# Patient Record
Sex: FEMALE | Race: WHITE | NOT HISPANIC OR LATINO | Employment: OTHER | URBAN - METROPOLITAN AREA
[De-identification: names, ages, dates, MRNs, and addresses within clinical notes are randomized per-mention and may not be internally consistent; named-entity substitution may affect disease eponyms.]

---

## 2017-04-21 ENCOUNTER — TELEPHONE (OUTPATIENT)
Dept: CARDIOLOGY | Facility: CLINIC | Age: 67
End: 2017-04-21

## 2017-04-21 DIAGNOSIS — I25.10 CORONARY ARTERY DISEASE WITHOUT ANGINA PECTORIS, UNSPECIFIED VESSEL OR LESION TYPE, UNSPECIFIED WHETHER NATIVE OR TRANSPLANTED HEART: ICD-10-CM

## 2017-04-21 DIAGNOSIS — Z01.818 PREOPERATIVE CLEARANCE: Primary | ICD-10-CM

## 2017-05-01 ENCOUNTER — HOSPITAL ENCOUNTER (OUTPATIENT)
Dept: CARDIOLOGY | Facility: HOSPITAL | Age: 67
Discharge: HOME | End: 2017-05-01
Attending: INTERNAL MEDICINE

## 2017-05-01 ENCOUNTER — APPOINTMENT (OUTPATIENT)
Dept: CARDIOLOGY | Facility: HOSPITAL | Age: 67
End: 2017-05-01
Attending: INTERNAL MEDICINE

## 2017-05-01 ENCOUNTER — HOSPITAL ENCOUNTER (OUTPATIENT)
Dept: CARDIOLOGY | Facility: HOSPITAL | Age: 67
Discharge: HOME | End: 2017-05-01
Attending: INTERNAL MEDICINE | Admitting: INTERNAL MEDICINE

## 2017-05-01 VITALS
DIASTOLIC BLOOD PRESSURE: 80 MMHG | BODY MASS INDEX: 38.51 KG/M2 | HEIGHT: 61 IN | WEIGHT: 204 LBS | HEART RATE: 55 BPM | SYSTOLIC BLOOD PRESSURE: 160 MMHG

## 2017-05-01 DIAGNOSIS — I25.10 CORONARY ARTERY DISEASE WITHOUT ANGINA PECTORIS, UNSPECIFIED VESSEL OR LESION TYPE, UNSPECIFIED WHETHER NATIVE OR TRANSPLANTED HEART: ICD-10-CM

## 2017-05-01 DIAGNOSIS — Z01.818 PREOPERATIVE CLEARANCE: ICD-10-CM

## 2017-05-01 PROCEDURE — 78452 HT MUSCLE IMAGE SPECT MULT: CPT

## 2017-05-01 PROCEDURE — 93018 CV STRESS TEST I&R ONLY: CPT | Performed by: INTERNAL MEDICINE

## 2017-05-01 PROCEDURE — 93017 CV STRESS TEST TRACING ONLY: CPT

## 2017-05-01 PROCEDURE — 25010000002 REGADENOSON 0.4 MG/5ML SOLUTION: Performed by: INTERNAL MEDICINE

## 2017-05-01 PROCEDURE — 78452 HT MUSCLE IMAGE SPECT MULT: CPT | Performed by: INTERNAL MEDICINE

## 2017-05-01 PROCEDURE — 0 TECHNETIUM SESTAMIBI: Performed by: INTERNAL MEDICINE

## 2017-05-01 PROCEDURE — A9500 TC99M SESTAMIBI: HCPCS | Performed by: INTERNAL MEDICINE

## 2017-05-01 RX ADMIN — Medication 1 DOSE: at 13:40

## 2017-05-01 RX ADMIN — Medication 1 DOSE: at 11:50

## 2017-05-01 RX ADMIN — REGADENOSON 0.4 MG: 0.08 INJECTION, SOLUTION INTRAVENOUS at 13:33

## 2017-05-02 LAB
BH CV STRESS BP STAGE 1: NORMAL
BH CV STRESS BP STAGE 3: NORMAL
BH CV STRESS COMMENTS STAGE 1: NORMAL
BH CV STRESS DOSE REGADENOSON STAGE 1: 0.4
BH CV STRESS DURATION MIN STAGE 1: 1
BH CV STRESS DURATION MIN STAGE 2: 1
BH CV STRESS DURATION MIN STAGE 3: 1
BH CV STRESS DURATION MIN STAGE 4: 1
BH CV STRESS DURATION SEC STAGE 1: 0
BH CV STRESS DURATION SEC STAGE 2: 0
BH CV STRESS DURATION SEC STAGE 3: 0
BH CV STRESS DURATION SEC STAGE 4: 0
BH CV STRESS HR STAGE 1: 62
BH CV STRESS HR STAGE 2: 82
BH CV STRESS HR STAGE 3: 80
BH CV STRESS HR STAGE 4: 79
BH CV STRESS O2 STAGE 1: 99
BH CV STRESS O2 STAGE 2: 100
BH CV STRESS O2 STAGE 3: 100
BH CV STRESS O2 STAGE 4: 99
BH CV STRESS PROTOCOL 1: NORMAL
BH CV STRESS RECOVERY BP: NORMAL MMHG
BH CV STRESS RECOVERY HR: 72 BPM
BH CV STRESS RECOVERY O2: 100 %
BH CV STRESS STAGE 1: 1
BH CV STRESS STAGE 2: 2
BH CV STRESS STAGE 3: 3
BH CV STRESS STAGE 4: 4
LV EF NUC BP: 62 %
MAXIMAL PREDICTED HEART RATE: 153 BPM
PERCENT MAX PREDICTED HR: 53.59 %
STRESS BASELINE BP: NORMAL MMHG
STRESS BASELINE HR: 53 BPM
STRESS O2 SAT REST: 99 %
STRESS PERCENT HR: 63 %
STRESS POST ESTIMATED WORKLOAD: 1 METS
STRESS POST EXERCISE DUR MIN: 4 MIN
STRESS POST EXERCISE DUR SEC: 0 SEC
STRESS POST O2 SAT PEAK: 99 %
STRESS POST PEAK BP: NORMAL MMHG
STRESS POST PEAK HR: 82 BPM
STRESS TARGET HR: 130 BPM

## 2017-06-26 ENCOUNTER — OFFICE VISIT (OUTPATIENT)
Dept: CARDIOLOGY | Facility: CLINIC | Age: 67
End: 2017-06-26

## 2017-06-26 VITALS
BODY MASS INDEX: 39.08 KG/M2 | DIASTOLIC BLOOD PRESSURE: 74 MMHG | HEIGHT: 61 IN | SYSTOLIC BLOOD PRESSURE: 136 MMHG | OXYGEN SATURATION: 97 % | WEIGHT: 207 LBS | HEART RATE: 60 BPM

## 2017-06-26 DIAGNOSIS — E78.2 MIXED HYPERLIPIDEMIA: ICD-10-CM

## 2017-06-26 DIAGNOSIS — I73.9 PVD (PERIPHERAL VASCULAR DISEASE) (HCC): Primary | ICD-10-CM

## 2017-06-26 DIAGNOSIS — I25.10 CORONARY ARTERY DISEASE INVOLVING NATIVE CORONARY ARTERY OF NATIVE HEART WITHOUT ANGINA PECTORIS: ICD-10-CM

## 2017-06-26 DIAGNOSIS — I10 ESSENTIAL HYPERTENSION: ICD-10-CM

## 2017-06-26 PROCEDURE — 99213 OFFICE O/P EST LOW 20 MIN: CPT | Performed by: INTERNAL MEDICINE

## 2017-06-26 RX ORDER — FLUOXETINE HYDROCHLORIDE 20 MG/1
20 CAPSULE ORAL DAILY
COMMUNITY

## 2017-06-26 RX ORDER — UBIDECARENONE 200 MG
100 CAPSULE ORAL DAILY
COMMUNITY

## 2017-06-26 NOTE — PROGRESS NOTES
[facility]  1950  [phone]          PCP: Lupe Tadeo, APRN  [address] [facility] [address]  [facility] [address]    IDENTIFICATION: A 66 y.o. female from [facility].      CHIEF COMPLAINT: Followup for coronary disease, peripheral vascular disease, hypertension, and dyslipidemia.      PROBLEM LIST:  1. Coronary artery disease:  a. Multiple interventions with greater than 60-mm stent to RCA, 2000 to 2006.   b. January 2007, CABG x3 with LIMA to LAD, SVG to PDA, and SVG to posterolateral of the RCA, Dr. Siddiqi.  c. April 2014, [facility]: Patent SVG to PDA. Occluded SVG to PL and atretic LIMA. Unchanged from 2011, normal LVEF.   d. 5/17 lexiscan wnl EF 62%  2. Peripheral vascular disease:  a. In 1997, infrarenal abdominal aortic stent per Dr. Spain.  b. In 2006, carotid duplex, nonobstructive disease, minimal plaque noted.  c. April 2008, 5.0 x 27 bifurcational Express stents to iliac, ostium per Dr. العلي.  d. April 2008, AA with runoffs with patent infrarenal aortic stent with no evidence of compromise.  e. Discrepant upper extremity blood pressures with a 20-mm gradient left subclavian stenosis, status post subclavian angiography revealing proximal left subclavian stenosis not felt in need of revascularization at current, January 2011.  f. September 2012, mechanical thrombectomy with stent x2 to the right common iliac artery, stent to the left common iliac artery, bifurcation stent placement to the left external iliac and left common iliac arteries, Dr. Earl.  g. Lower extremity arterial duplex per Dr. Earl reportedly nonobstructive, April 2015.  3. Bilateral knee pain, followed in [facility] per orthopedics:  a. Left total knee replacement surgery per Dr. Fierro, September 2015.  4. Hyperlipidemia.  5. Obstructive sleep apnea on CPAP.  6. Nicotine addiction with smoking cessation, 2011.  7. Dyslipidemia.  8. Gastroesophageal reflux disease.  9. Depression, , 2006.   10. Surgical  history:  a. Appendectomy.   b. Cholecystectomy.   Infrarenal abdominal stent, Dr. Spain, 1997.    Chief Complaint   Patient presents with   • Follow-up     CAD           Allergies  Allergies   Allergen Reactions   • Bactrim [Sulfamethoxazole-Trimethoprim] Itching   • Crestor [Rosuvastatin Calcium]    • Keflex [Cephalexin] Itching   • Lipitor [Atorvastatin] Other (See Comments)     high-dose with thrush.     • Lisinopril    • Lortab [Hydrocodone-Acetaminophen]    • Percocet [Oxycodone-Acetaminophen]    • Pravachol [Pravastatin Sodium]    • Zocor [Simvastatin] Myalgia     Muscle ache   • Diclofenac Sodium Rash   • Diphenhydramine Rash   • Estradiol Rash     Vaginal cream caused itching and rash   • Prednisone Rash   • Tizanidine Hcl Rash       Current Medications    Current Outpatient Prescriptions:   •  aspirin 325 MG tablet, Take  by mouth., Disp: , Rfl:   •  atorvastatin (LIPITOR) 40 MG tablet, Take 80 mg by mouth., Disp: , Rfl:   •  clopidogrel (PLAVIX) 75 MG tablet, Take  by mouth daily., Disp: , Rfl:   •  coenzyme Q10 100 MG capsule, Take 100 mg by mouth Daily., Disp: , Rfl:   •  FLUoxetine (PROzac) 20 MG capsule, Take 20 mg by mouth Daily., Disp: , Rfl:   •  folic acid (FOLVITE) 800 MCG tablet, Take 400 mg by mouth daily., Disp: , Rfl:   •  furosemide (LASIX) 40 MG tablet, Take 20 mg by mouth As Needed., Disp: , Rfl:   •  isosorbide dinitrate (ISORDIL) 30 MG tablet, Take  by mouth Daily., Disp: , Rfl:   •  metoprolol succinate XL (TOPROL-XL) 50 MG 24 hr tablet, Take  by mouth daily., Disp: , Rfl:   •  nitroglycerin (NITROSTAT) 0.4 MG SL tablet, Place  under the tongue., Disp: , Rfl:   •  Omega-3 Fatty Acids (FISH OIL) 1000 MG capsule capsule, Take  by mouth daily., Disp: , Rfl:   •  pantoprazole (PROTONIX) 40 MG EC tablet, Take  by mouth daily., Disp: , Rfl:     History of Present Illness     Pt denies any chest pain, dyspnea, dyspnea on exertion, orthopnea, PND, palpitations, lower extremity edema.  She does  "note however significant bilateral leg pain with most any activities even walking through the home.  She states this is similar to her prior discomfort prior to catheter-based intervention and revascularization of her lower extremities historically.  She has was to see Dr. Fuentes this year but she did not seek further treatment.  She is now sought second opinion quitting clinic and is scheduled 13th of next month undergo attempt at lower extremity revascularization    ROS:  All systems have been reviewed and are negative with the exception of those mentioned in the HPI.    OBJECTIVE:  Vitals:    06/26/17 1425   BP: 136/74   BP Location: Right arm   Patient Position: Sitting   Pulse: 60   SpO2: 97%   Weight: 207 lb (93.9 kg)   Height: 61\" (154.9 cm)     Physical Exam   Constitutional: She appears well-developed and well-nourished.   Neck: Normal range of motion. Neck supple. No hepatojugular reflux and no JVD present. Carotid bruit is not present. No tracheal deviation present. No thyromegaly present.   Cardiovascular: Normal rate, regular rhythm, S1 normal, S2 normal and intact distal pulses.  PMI is not displaced.  Exam reveals no gallop, no distant heart sounds, no friction rub, no midsystolic click and no opening snap.    No murmur heard.  Pulses:       Radial pulses are 2+ on the right side, and 2+ on the left side.        Dorsalis pedis pulses are 1+ on the right side, and 0 on the left side.        Posterior tibial pulses are 1+ on the right side, and 0 on the left side.   Pulmonary/Chest: Effort normal and breath sounds normal. She has no wheezes. She has no rales.   Abdominal: Soft. Bowel sounds are normal. She exhibits no mass. There is no tenderness. There is no guarding.       Diagnostic Data:  Procedures      ASSESSMENT:   Diagnosis Plan   1. PVD (peripheral vascular disease)     2. Essential hypertension     3. Coronary artery disease involving native coronary artery of native heart without angina " pectoris     4. Mixed hyperlipidemia         PLAN:  Provider disease historical revascularization will follow-up with [facility] for her proposed revascularization next month    Hypertension presumed essential controlled on current regimen    coronary disease coronary disease status post surgical revascularization with no anginal equivalent    Dyslipidemia tolerant of statin therapy on coenzyme every 10 continue such    Plan follow-up 9 months otherwise  Lupe Tadeo, APRN, thank you for referring Ms. Flowers for evaluation.  I have forwarded my electronically generated recommendations to you for review.  Please do not hesitate to call with any questions.      Octavio Barber MD, FACC

## 2017-07-17 ENCOUNTER — OFFICE VISIT (OUTPATIENT)
Dept: SLEEP MEDICINE | Facility: HOSPITAL | Age: 67
End: 2017-07-17

## 2017-07-17 VITALS
HEART RATE: 60 BPM | OXYGEN SATURATION: 96 % | HEIGHT: 61 IN | DIASTOLIC BLOOD PRESSURE: 72 MMHG | SYSTOLIC BLOOD PRESSURE: 118 MMHG | WEIGHT: 204.8 LBS | BODY MASS INDEX: 38.67 KG/M2

## 2017-07-17 DIAGNOSIS — Z99.89 OBSTRUCTIVE SLEEP APNEA ON CPAP: ICD-10-CM

## 2017-07-17 DIAGNOSIS — E66.9 OBESITY (BMI 30-39.9): Primary | ICD-10-CM

## 2017-07-17 DIAGNOSIS — G47.33 OBSTRUCTIVE SLEEP APNEA ON CPAP: ICD-10-CM

## 2017-07-17 PROCEDURE — 99214 OFFICE O/P EST MOD 30 MIN: CPT | Performed by: INTERNAL MEDICINE

## 2017-07-17 NOTE — PROGRESS NOTES
Subjective:     Chief Complaint:   Chief Complaint   Patient presents with   • Follow-up       HPI:    Daina Flowers is a 66 y.o. female here for follow-up of obstructive sleep apnea.    Since starting PAP sleep problem has: remained the same  Currently using PAP: yes Hours of usage during the night: 6    Amount of sleep per night : 6 hours  Average length of time it takes to fall asleep : 5 minutes  Number of awakenings per night : 0     She feels fatigue (tiredness, exhaustion, lethargy) in the daytime even when not sleepy? Occasionally   She feels sleepy (or struggles to stay awake) in the daytime? Infrequently    Galt Scale scored as 3/24.    Type of mask: nasal mask    She (since starting PAP or since last visit) has problems with the following:   Pressure from the mask 0 - No Problem  Skin irritation from the mask 0 - No Problem  Mask coming off face 0 - No Problem  Air leaks from the mask 0 - No Problem  Dry mouth or throat 4 - Moderate Problems  Nasal congestion 0 - No Problem    I reviewed her PAP download:  Average pressure: 10  Average AHI:  4  Average minutes in large leak per night: 2      Current medications are:   Current Outpatient Prescriptions:   •  aspirin 325 MG tablet, Take  by mouth., Disp: , Rfl:   •  atorvastatin (LIPITOR) 40 MG tablet, Take 80 mg by mouth., Disp: , Rfl:   •  clopidogrel (PLAVIX) 75 MG tablet, Take  by mouth daily., Disp: , Rfl:   •  coenzyme Q10 100 MG capsule, Take 100 mg by mouth Daily., Disp: , Rfl:   •  FLUoxetine (PROzac) 20 MG capsule, Take 20 mg by mouth Daily., Disp: , Rfl:   •  folic acid (FOLVITE) 800 MCG tablet, Take 400 mg by mouth daily., Disp: , Rfl:   •  furosemide (LASIX) 40 MG tablet, Take 20 mg by mouth As Needed., Disp: , Rfl:   •  isosorbide dinitrate (ISORDIL) 30 MG tablet, Take  by mouth Daily., Disp: , Rfl:   •  metoprolol succinate XL (TOPROL-XL) 50 MG 24 hr tablet, Take  by mouth daily., Disp: , Rfl:   •  nitroglycerin (NITROSTAT) 0.4 MG SL  tablet, Place  under the tongue., Disp: , Rfl:   •  Omega-3 Fatty Acids (FISH OIL) 1000 MG capsule capsule, Take  by mouth daily., Disp: , Rfl:   •  pantoprazole (PROTONIX) 40 MG EC tablet, Take  by mouth daily., Disp: , Rfl: .      The patient's relevant past medical, surgical, family and social history were reviewed and updated in Epic as appropriate.     ROS:    Review of Systems   Constitutional: Positive for fatigue.   Respiratory: Positive for apnea.    Psychiatric/Behavioral: Positive for sleep disturbance.         Objective:    Physical Exam   Constitutional: She is oriented to person, place, and time. She appears well-developed and well-nourished.   HENT:   Head: Normocephalic and atraumatic.   Mouth/Throat: Oropharynx is clear and moist.   Upper denture plate in place  Class IV airway   Neck: Neck supple. No thyromegaly present.   Cardiovascular: Normal rate and regular rhythm.  Exam reveals no gallop and no friction rub.    No murmur heard.  Pulmonary/Chest: Effort normal. No respiratory distress. She has no wheezes. She has no rales.   Musculoskeletal: She exhibits no edema.   Neurological: She is alert and oriented to person, place, and time.   Skin: Skin is warm and dry.   Psychiatric: She has a normal mood and affect. Her behavior is normal.   Vitals reviewed.      Data:    Patient's PAP download was personally reviewed including raw data and results.    Assessment:    Problem List Items Addressed This Visit        Pulmonary Problems    Obstructive sleep apnea on CPAP    Relevant Orders    PAP Therapy       Other    Obesity (BMI 30-39.9) - Primary          She is experiencing some mask discomfort and thinks she would do better with a nasal pillow mask.  I showed her some examples of that.    Plan:     No change in PAP settings.  Change to nasal pillow mask if possible  Continued efforts at further weight loss.  I urged 7-8 hours of sleep per night on average.  I renewed supplies for the next  year.  Follow-up scheduled.  If problems in the interim she knows how to contact us or her DME company.  Discussed the above in detail with the patient.        Signed by  Keyshawn Waters MD

## 2018-04-09 ENCOUNTER — OFFICE VISIT (OUTPATIENT)
Dept: CARDIOLOGY | Facility: CLINIC | Age: 68
End: 2018-04-09

## 2018-04-09 VITALS
SYSTOLIC BLOOD PRESSURE: 136 MMHG | HEART RATE: 65 BPM | DIASTOLIC BLOOD PRESSURE: 50 MMHG | WEIGHT: 211.4 LBS | HEIGHT: 61 IN | BODY MASS INDEX: 39.91 KG/M2

## 2018-04-09 DIAGNOSIS — I73.9 PERIPHERAL VASCULAR DISEASE (HCC): Primary | ICD-10-CM

## 2018-04-09 DIAGNOSIS — E78.2 MIXED HYPERLIPIDEMIA: ICD-10-CM

## 2018-04-09 DIAGNOSIS — I25.10 CORONARY ARTERY DISEASE INVOLVING NATIVE CORONARY ARTERY OF NATIVE HEART WITHOUT ANGINA PECTORIS: ICD-10-CM

## 2018-04-09 DIAGNOSIS — I10 ESSENTIAL HYPERTENSION: ICD-10-CM

## 2018-04-09 PROCEDURE — 99214 OFFICE O/P EST MOD 30 MIN: CPT | Performed by: INTERNAL MEDICINE

## 2018-04-09 NOTE — PROGRESS NOTES
Daina Flowers  1950  [phone]    VISIT DATE: 4/9/2018     PCP: Lupe Tadeo, APRN  [address] [facility] [address]  [facility] [address]    IDENTIFICATION: A 67 y.o. female from [facility].      CHIEF COMPLAINT: Followup for coronary disease, peripheral vascular disease, hypertension, and dyslipidemia.      PROBLEM LIST:  1. Coronary artery disease:  a. Multiple interventions with greater than 60-mm stent to RCA, 2000 to 2006.   b. January 2007, CABG x3 with LIMA to LAD, SVG to PDA, and SVG to posterolateral of the RCA, Dr. Siddiqi.  c. April 2014, [facility]: Patent SVG to PDA. Occluded SVG to PL and atretic LIMA. Unchanged from 2011, normal LVEF.   d. 5/17 lexiscan wnl EF 62%  2. Peripheral vascular disease:  a. In 1997, infrarenal abdominal aortic stent per Dr. Spain.  b. In 2006, carotid duplex, nonobstructive disease, minimal plaque noted.  c. April 2008, 5.0 x 27 bifurcational Express stents to iliac, ostium per Dr. العلي.  d. April 2008, AA with runoffs with patent infrarenal aortic stent with no evidence of compromise.  e. Discrepant upper extremity blood pressures with a 20-mm gradient left subclavian stenosis, status post subclavian angiography revealing proximal left subclavian stenosis not felt in need of revascularization at current, January 2011.  f. September 2012, mechanical thrombectomy with stent x2 to the right common iliac artery, stent to the left common iliac artery, bifurcation stent placement to the left external iliac and left common iliac arteries, Dr. Earl.  g. 7/2017 stent to LLE per [facility]   3. Bilateral knee pain, followed in [facility] per orthopedics:  a. Left total knee replacement surgery per Dr. Fierro, September 2015.  4. Hyperlipidemia.  5. Obstructive sleep apnea on CPAP.  6. Nicotine addiction with smoking cessation, 2011.  7. Dyslipidemia.  8. Gastroesophageal reflux disease.  9. Depression, , 2006.   10. Surgical history:  a. Appendectomy.    b. Cholecystectomy.   c. Infrarenal abdominal stent, Dr. Spain, 1997.    Chief Complaint   Patient presents with   • Coronary Artery Disease   • Dizziness     off and on at home       Allergies  Allergies   Allergen Reactions   • Bactrim [Sulfamethoxazole-Trimethoprim] Itching   • Crestor [Rosuvastatin Calcium]    • Keflex [Cephalexin] Itching   • Lipitor [Atorvastatin] Other (See Comments)     high-dose with thrush.     • Lisinopril    • Lortab [Hydrocodone-Acetaminophen]    • Percocet [Oxycodone-Acetaminophen]    • Pravachol [Pravastatin Sodium]    • Zocor [Simvastatin] Myalgia     Muscle ache   • Diclofenac Sodium Rash   • Diphenhydramine Rash   • Estradiol Rash     Vaginal cream caused itching and rash   • Prednisone Rash   • Tizanidine Hcl Rash       Current Medications    Current Outpatient Prescriptions:   •  aspirin 325 MG tablet, Take  by mouth., Disp: , Rfl:   •  atorvastatin (LIPITOR) 40 MG tablet, Take 80 mg by mouth., Disp: , Rfl:   •  clopidogrel (PLAVIX) 75 MG tablet, Take  by mouth daily., Disp: , Rfl:   •  coenzyme Q10 100 MG capsule, Take 100 mg by mouth Daily., Disp: , Rfl:   •  FLUoxetine (PROzac) 20 MG capsule, Take 20 mg by mouth Daily., Disp: , Rfl:   •  folic acid (FOLVITE) 800 MCG tablet, Take 400 mg by mouth daily., Disp: , Rfl:   •  furosemide (LASIX) 40 MG tablet, Take 20 mg by mouth As Needed., Disp: , Rfl:   •  isosorbide dinitrate (ISORDIL) 30 MG tablet, Take  by mouth Daily., Disp: , Rfl:   •  metoprolol succinate XL (TOPROL-XL) 50 MG 24 hr tablet, Take  by mouth daily., Disp: , Rfl:   •  nitroglycerin (NITROSTAT) 0.4 MG SL tablet, Place  under the tongue., Disp: , Rfl:   •  Omega-3 Fatty Acids (FISH OIL) 1000 MG capsule capsule, Take  by mouth daily., Disp: , Rfl:   •  pantoprazole (PROTONIX) 40 MG EC tablet, Take  by mouth daily., Disp: , Rfl:     History of Present Illness   Pt is doing well. She had revascularization done on her LLE at [facility] last July and her  "claudication has resolved since.   Pt denies any chest pain, dyspnea, dyspnea on exertion, orthopnea, PND, palpitations, lower extremity edema.   Plans on going on a cruise with her sister later this spring.     ROS:  All systems have been reviewed and are negative with the exception of those mentioned in the HPI.    OBJECTIVE:  Vitals:    04/09/18 1409   BP: 136/50   BP Location: Right arm   Patient Position: Sitting   Pulse: 65   Weight: 95.9 kg (211 lb 6.4 oz)   Height: 154.9 cm (61\")     Physical Exam   Constitutional: She appears well-developed and well-nourished.   Neck: Normal range of motion. Neck supple. No hepatojugular reflux and no JVD present. Carotid bruit is not present. No tracheal deviation present. No thyromegaly present.   Cardiovascular: Normal rate, regular rhythm, S1 normal, S2 normal and intact distal pulses.  PMI is not displaced.  Exam reveals no gallop, no distant heart sounds, no friction rub, no midsystolic click and no opening snap.    No murmur heard.  Pulses:       Radial pulses are 2+ on the right side, and 2+ on the left side.        Dorsalis pedis pulses are 1+ on the right side, and 0 on the left side.        Posterior tibial pulses are 1+ on the right side, and 0 on the left side.   Pulmonary/Chest: Effort normal and breath sounds normal. She has no wheezes. She has no rales.   Abdominal: Soft. Bowel sounds are normal. She exhibits no mass. There is no tenderness. There is no guarding.       Diagnostic Data:  Procedures      ASSESSMENT:   Diagnosis Plan   1. Peripheral vascular disease     2. Essential hypertension     3. Coronary artery disease involving native coronary artery of native heart without angina pectoris     4. Mixed hyperlipidemia         PLAN:  S/p revascularization of LLE per [facility]    Hypertension presumed essential controlled on current regimen    coronary disease coronary disease status post surgical revascularization with no anginal " equivalent    Dyslipidemia tolerant of statin therapy on coq10     Plan follow-up 9 months otherwise      Lupe Tadeo, SONAL, thank you for referring Ms. Flowers for evaluation.  I have forwarded my electronically generated recommendations to you for review.  Please do not hesitate to call with any questions.    Scribed for Octavio Barber MD by Sylvia Thayer PA-C. 4/9/2018  2:30 PM  I, Octavio Barber MD, personally performed the services described in this documentation as scribed by the above named individual in my presence, and it is both accurate and complete.  4/9/2018  2:36 PM    Octavio Barber MD, [facility]

## 2018-07-10 ENCOUNTER — APPOINTMENT (OUTPATIENT)
Dept: GENERAL RADIOLOGY | Facility: HOSPITAL | Age: 68
End: 2018-07-10

## 2018-07-10 ENCOUNTER — HOSPITAL ENCOUNTER (EMERGENCY)
Facility: HOSPITAL | Age: 68
Discharge: HOME | End: 2018-07-10
Attending: EMERGENCY MEDICINE | Admitting: EMERGENCY MEDICINE

## 2018-07-10 ENCOUNTER — APPOINTMENT (OUTPATIENT)
Dept: CT IMAGING | Facility: HOSPITAL | Age: 68
End: 2018-07-10

## 2018-07-10 ENCOUNTER — TELEPHONE (OUTPATIENT)
Dept: CARDIOLOGY | Facility: CLINIC | Age: 68
End: 2018-07-10

## 2018-07-10 VITALS
SYSTOLIC BLOOD PRESSURE: 138 MMHG | DIASTOLIC BLOOD PRESSURE: 41 MMHG | BODY MASS INDEX: 38.71 KG/M2 | OXYGEN SATURATION: 97 % | WEIGHT: 205 LBS | HEIGHT: 61 IN | RESPIRATION RATE: 16 BRPM | TEMPERATURE: 97.6 F | HEART RATE: 55 BPM

## 2018-07-10 DIAGNOSIS — G89.29 CHRONIC PAIN OF RIGHT UPPER EXTREMITY: Primary | ICD-10-CM

## 2018-07-10 DIAGNOSIS — M79.601 CHRONIC PAIN OF RIGHT UPPER EXTREMITY: Primary | ICD-10-CM

## 2018-07-10 LAB
ALBUMIN SERPL-MCNC: 4.36 G/DL (ref 3.2–4.8)
ALBUMIN/GLOB SERPL: 1.4 G/DL (ref 1.5–2.5)
ALP SERPL-CCNC: 125 U/L (ref 25–100)
ALT SERPL W P-5'-P-CCNC: 23 U/L (ref 7–40)
ANION GAP SERPL CALCULATED.3IONS-SCNC: 6 MMOL/L (ref 3–11)
AST SERPL-CCNC: 22 U/L (ref 0–33)
BASOPHILS # BLD AUTO: 0.03 10*3/MM3 (ref 0–0.2)
BASOPHILS NFR BLD AUTO: 0.6 % (ref 0–1)
BILIRUB SERPL-MCNC: 0.4 MG/DL (ref 0.3–1.2)
BNP SERPL-MCNC: 82 PG/ML (ref 0–100)
BUN BLD-MCNC: 14 MG/DL (ref 9–23)
BUN/CREAT SERPL: 15.6 (ref 7–25)
CALCIUM SPEC-SCNC: 9.2 MG/DL (ref 8.7–10.4)
CHLORIDE SERPL-SCNC: 105 MMOL/L (ref 99–109)
CO2 SERPL-SCNC: 27 MMOL/L (ref 20–31)
CREAT BLD-MCNC: 0.9 MG/DL (ref 0.6–1.3)
DEPRECATED RDW RBC AUTO: 53.4 FL (ref 37–54)
EOSINOPHIL # BLD AUTO: 0.16 10*3/MM3 (ref 0–0.3)
EOSINOPHIL NFR BLD AUTO: 3 % (ref 0–3)
ERYTHROCYTE [DISTWIDTH] IN BLOOD BY AUTOMATED COUNT: 15.9 % (ref 11.3–14.5)
GFR SERPL CREATININE-BSD FRML MDRD: 62 ML/MIN/1.73
GLOBULIN UR ELPH-MCNC: 3 GM/DL
GLUCOSE BLD-MCNC: 124 MG/DL (ref 70–100)
HCT VFR BLD AUTO: 39.6 % (ref 34.5–44)
HGB BLD-MCNC: 12.7 G/DL (ref 11.5–15.5)
HOLD SPECIMEN: NORMAL
HOLD SPECIMEN: NORMAL
IMM GRANULOCYTES # BLD: 0.02 10*3/MM3 (ref 0–0.03)
IMM GRANULOCYTES NFR BLD: 0.4 % (ref 0–0.6)
LIPASE SERPL-CCNC: 41 U/L (ref 6–51)
LYMPHOCYTES # BLD AUTO: 1.64 10*3/MM3 (ref 0.6–4.8)
LYMPHOCYTES NFR BLD AUTO: 30.7 % (ref 24–44)
MCH RBC QN AUTO: 29.1 PG (ref 27–31)
MCHC RBC AUTO-ENTMCNC: 32.1 G/DL (ref 32–36)
MCV RBC AUTO: 90.6 FL (ref 80–99)
MONOCYTES # BLD AUTO: 0.59 10*3/MM3 (ref 0–1)
MONOCYTES NFR BLD AUTO: 11 % (ref 0–12)
NEUTROPHILS # BLD AUTO: 2.91 10*3/MM3 (ref 1.5–8.3)
NEUTROPHILS NFR BLD AUTO: 54.3 % (ref 41–71)
PLATELET # BLD AUTO: 188 10*3/MM3 (ref 150–450)
PMV BLD AUTO: 9.4 FL (ref 6–12)
POTASSIUM BLD-SCNC: 4.2 MMOL/L (ref 3.5–5.5)
PROT SERPL-MCNC: 7.4 G/DL (ref 5.7–8.2)
RBC # BLD AUTO: 4.37 10*6/MM3 (ref 3.89–5.14)
SODIUM BLD-SCNC: 138 MMOL/L (ref 132–146)
TROPONIN I SERPL-MCNC: 0 NG/ML (ref 0–0.07)
TROPONIN I SERPL-MCNC: 0 NG/ML (ref 0–0.07)
WBC NRBC COR # BLD: 5.35 10*3/MM3 (ref 3.5–10.8)
WHOLE BLOOD HOLD SPECIMEN: NORMAL
WHOLE BLOOD HOLD SPECIMEN: NORMAL

## 2018-07-10 PROCEDURE — 99284 EMERGENCY DEPT VISIT MOD MDM: CPT

## 2018-07-10 PROCEDURE — 73030 X-RAY EXAM OF SHOULDER: CPT

## 2018-07-10 PROCEDURE — 83880 ASSAY OF NATRIURETIC PEPTIDE: CPT | Performed by: EMERGENCY MEDICINE

## 2018-07-10 PROCEDURE — 73090 X-RAY EXAM OF FOREARM: CPT

## 2018-07-10 PROCEDURE — 80053 COMPREHEN METABOLIC PANEL: CPT | Performed by: EMERGENCY MEDICINE

## 2018-07-10 PROCEDURE — 36415 COLL VENOUS BLD VENIPUNCTURE: CPT

## 2018-07-10 PROCEDURE — 71045 X-RAY EXAM CHEST 1 VIEW: CPT

## 2018-07-10 PROCEDURE — 73070 X-RAY EXAM OF ELBOW: CPT

## 2018-07-10 PROCEDURE — 93005 ELECTROCARDIOGRAM TRACING: CPT | Performed by: EMERGENCY MEDICINE

## 2018-07-10 PROCEDURE — 73060 X-RAY EXAM OF HUMERUS: CPT

## 2018-07-10 PROCEDURE — 85025 COMPLETE CBC W/AUTO DIFF WBC: CPT | Performed by: EMERGENCY MEDICINE

## 2018-07-10 PROCEDURE — 84484 ASSAY OF TROPONIN QUANT: CPT

## 2018-07-10 PROCEDURE — 71275 CT ANGIOGRAPHY CHEST: CPT

## 2018-07-10 PROCEDURE — 0 IOPAMIDOL PER 1 ML: Performed by: EMERGENCY MEDICINE

## 2018-07-10 PROCEDURE — 83690 ASSAY OF LIPASE: CPT | Performed by: EMERGENCY MEDICINE

## 2018-07-10 RX ORDER — SODIUM CHLORIDE 0.9 % (FLUSH) 0.9 %
10 SYRINGE (ML) INJECTION AS NEEDED
Status: DISCONTINUED | OUTPATIENT
Start: 2018-07-10 | End: 2018-07-10 | Stop reason: HOSPADM

## 2018-07-10 RX ORDER — TRAMADOL HYDROCHLORIDE 50 MG/1
50 TABLET ORAL EVERY 12 HOURS PRN
Qty: 10 TABLET | Refills: 0 | Status: SHIPPED | OUTPATIENT
Start: 2018-07-10 | End: 2019-04-15

## 2018-07-10 RX ORDER — TRAMADOL HYDROCHLORIDE 50 MG/1
50 TABLET ORAL ONCE
Status: DISCONTINUED | OUTPATIENT
Start: 2018-07-10 | End: 2018-07-10 | Stop reason: HOSPADM

## 2018-07-10 RX ORDER — ACETAMINOPHEN 500 MG
1000 TABLET ORAL ONCE
Status: COMPLETED | OUTPATIENT
Start: 2018-07-10 | End: 2018-07-10

## 2018-07-10 RX ADMIN — IOPAMIDOL 80 ML: 755 INJECTION, SOLUTION INTRAVENOUS at 11:09

## 2018-07-10 RX ADMIN — ACETAMINOPHEN 1000 MG: 500 TABLET, FILM COATED ORAL at 12:52

## 2018-07-10 NOTE — TELEPHONE ENCOUNTER
Patient called wanting to schedule an appt with Dr Barber right away as she was seen in ER for right arm pain. Advised her to f/u with chest pain clinic as er advised and pcp in a week. If chest pain clinic feels she needs earlier appt with DR Barber they will contact us. Patient verbalized understanding.

## 2018-07-10 NOTE — ED PROVIDER NOTES
Subjective   67-year-old female with extensive past medical history, most notably coronary artery disease, presents for evaluation of atraumatic right upper extremity pain.  She states that at approximately 5:30 AM this morning, she began experiencing pain in her right shoulder that radiated to her right upper extremity as well as her upper back and has persisted since that time.  No accompanying chest pain or shortness of breath.  No vomiting.  No diaphoresis.  She is right-handed.  She is unsure as to what may have triggered her symptoms and denies any recent injury, fall, or trauma.  No paresthesias.  The pain is exacerbated by movement of her right upper extremity and range of motion.  No fevers or systemic symptoms.  Her pain is currently 8 out of 10 in severity.        History provided by:  Patient  Arm Pain   Location:  Right arm   Quality:  8/10  Severity:  Moderate  Onset quality:  Gradual  Duration:  5 hours  Timing:  Constant  Progression:  Worsening  Chronicity:  New  Context:  She is right handed, no trauma or heavy lifting. distal pain to proximal arm/shoulder   Relieved by:  Nothing  Worsened by:  Movement  Ineffective treatments:  Rest  Associated symptoms: myalgias    Associated symptoms: no abdominal pain, no chest pain and no rash    Risk factors:  H/o MI       Review of Systems   Cardiovascular: Negative for chest pain.   Gastrointestinal: Negative for abdominal pain.   Musculoskeletal: Positive for myalgias. Negative for back pain and neck pain.        Right upper extremity pain   Skin: Negative for rash.   All other systems reviewed and are negative.      Past Medical History:   Diagnosis Date   • Coronary artery disease    • Depression 2006    Depression, , 2006.    • Dyslipidemia    • GERD (gastroesophageal reflux disease)    • Hyperlipidemia    • Knee pain    • Nicotine addiction     Nicotine addiction with smoking cessation, 2011.   • Obstructive sleep apnea on CPAP    • Peripheral  vascular disease (CMS/HCC)        Allergies   Allergen Reactions   • Bactrim [Sulfamethoxazole-Trimethoprim] Itching   • Crestor [Rosuvastatin Calcium]    • Keflex [Cephalexin] Itching   • Lipitor [Atorvastatin] Other (See Comments)     high-dose with thrush.     • Lisinopril    • Lortab [Hydrocodone-Acetaminophen]    • Percocet [Oxycodone-Acetaminophen]    • Pravachol [Pravastatin Sodium]    • Zocor [Simvastatin] Myalgia     Muscle ache   • Diclofenac Sodium Rash   • Diphenhydramine Rash   • Estradiol Rash     Vaginal cream caused itching and rash   • Prednisone Rash   • Tizanidine Hcl Rash       Past Surgical History:   Procedure Laterality Date   • ABDOMINAL AORTA STENT  1997    Infrarenal abdominal stent, Dr. Spain, 1997.   • APPENDECTOMY     • CHOLECYSTECTOMY     • CORONARY ANGIOPLASTY WITH STENT PLACEMENT Right 2000    Multiple interventions with greater than 60-mm stent to RCA, 2000 to 2006.    • CORONARY ARTERY BYPASS GRAFT  01/2007 January 2007, CABG x3 with LIMA to LAD, SVG to PDA, and SVG to posterolateral of the RCA, Dr. Siddiqi.   • ILIAC ARTERY STENT  04/2008 April 2008, 5.0 x 27 bifurcational Express stents to iliac, ostium per Dr. العلي.   • ILIAC ARTERY STENT Left 09/2012 September 2012, mechanical thrombectomy with stent x2 to the right common iliac artery, stent to the left common iliac artery, bifurcation stent placement to the left external iliac and left common iliac arteries, Dr. Earl.   • TOTAL KNEE ARTHROPLASTY Left 09/2015    Left total knee replacement surgery per Dr. Fierro, September 2015.       Family History   Problem Relation Age of Onset   • Heart disease Mother    • Hypertension Mother    • Heart attack Mother    • Stroke Father        Social History     Social History   • Marital status:      Social History Main Topics   • Smoking status: Former Smoker     Quit date: 2011   • Smokeless tobacco: Never Used   • Alcohol use No   • Drug use: No   • Sexual activity:  Defer     Other Topics Concern   • Not on file         Objective   Physical Exam   Constitutional: She is oriented to person, place, and time. She appears well-developed and well-nourished. No distress.   Well-appearing elderly female in no acute distress   HENT:   Head: Normocephalic and atraumatic.   Mouth/Throat: Oropharynx is clear and moist.   Neck: Normal range of motion. Neck supple. No JVD present.   No bruits   Cardiovascular: Normal rate, regular rhythm, normal heart sounds and intact distal pulses.  Exam reveals no gallop and no friction rub.    No murmur heard.  No pulse deficits   Pulmonary/Chest: Effort normal and breath sounds normal. No respiratory distress. She has no wheezes. She has no rales.   Abdominal: Soft. Bowel sounds are normal. She exhibits no distension and no mass. There is no tenderness. There is no rebound and no guarding.   Musculoskeletal: Normal range of motion.   Range of motion of right upper extremity within normal limits but painful with abduction of right shoulder, neurovascularly intact distally with bounding distal pulses and normal sensation, normal  strength   Neurological: She is alert and oriented to person, place, and time. No cranial nerve deficit or sensory deficit. Coordination normal.   Skin: Skin is warm and dry. No rash noted. She is not diaphoretic. No erythema.   Psychiatric: She has a normal mood and affect. Judgment and thought content normal.   Nursing note and vitals reviewed.      Procedures         ED Course  ED Course as of Jul 10 2109   Tue Jul 10, 2018   1026 67-year-old female presents complaining of atraumatic right upper extremity pain and 5:30 AM.  Of note, she has a history of coronary artery disease.  She denies any injury, trauma, fall, or known triggers for her symptoms.  Of note she is right-handed.  Her pain is exacerbated by range of motion and movement.  No accompanying chest pain, shortness of breath, vomiting, or diaphoresis.  On  "arrival to the ED, patient well-appearing with benign exam.  Right upper extremity neurovascularly intact distally with bounding distal pulses, normal sensation, and normal range of motion.  Her initial EKG revealed normal sinus rhythm with a heart rate of 64 and an incomplete right bundle branch block but was otherwise unrevealing.  We will obtain labs and imaging and reassess following initial interventions.  [DD]   1111 Labs unrevealing.  [DD]   1113 Plain films negative.  [DD]   1218 Chest CTA negative for acute emergent process.  [DD]   1332 Repeat troponin/EKG negative/unchanged.  Upon reevaluation, patient improved.  Reassured and counseled regarding symptomatic treatment.  Doubt ACS, PE, dissection, or emergent cardiothoracic process at this time based on exam, history, current presentation, and gestalt, and objective findings in the ED.  I feel that the patient's symptoms are most likely musculoskeletal in nature.  Reassured and counseled regarding symptomatic treatment.  She will follow-up with her primary care physician within one week.  Additionally, referred to our chest pain clinic and will follow-up within 72 hours based on her history.  Agreeable with plan and given appropriate return precautions.  [DD]   1354 Range of motion in right shoulder within normal limits.  Doubt septic joint.  [DD]      ED Course User Index  [DD] Michael Roper MD      No results found for this or any previous visit (from the past 24 hour(s)).  Note: In addition to lab results from this visit, the labs listed above may include labs taken at another facility or during a different encounter within the last 24 hours. Please correlate lab times with ED admission and discharge times for further clarification of the services performed during this visit.    XR Chest 1 View    (Results Pending)   CT Angiogram Chest With Contrast    (Results Pending)     Vitals:    07/10/18 1013   Weight: 93 kg (205 lb)   Height: 154.9 cm (61\") "     Medications   sodium chloride 0.9 % flush 10 mL (not administered)     ECG/EMG Results (last 24 hours)     Procedure Component Value Units Date/Time    ECG 12 Lead [[id number]] Collected:  07/10/18 1015     Updated:  07/10/18 1016                    Recent Results (from the past 24 hour(s))   POC Troponin, Rapid    Collection Time: 07/10/18 10:15 AM   Result Value Ref Range    Troponin I 0.00 0.00 - 0.07 ng/mL   Comprehensive Metabolic Panel    Collection Time: 07/10/18 10:18 AM   Result Value Ref Range    Glucose 124 (H) 70 - 100 mg/dL    BUN 14 9 - 23 mg/dL    Creatinine 0.90 0.60 - 1.30 mg/dL    Sodium 138 132 - 146 mmol/L    Potassium 4.2 3.5 - 5.5 mmol/L    Chloride 105 99 - 109 mmol/L    CO2 27.0 20.0 - 31.0 mmol/L    Calcium 9.2 8.7 - 10.4 mg/dL    Total Protein 7.4 5.7 - 8.2 g/dL    Albumin 4.36 3.20 - 4.80 g/dL    ALT (SGPT) 23 7 - 40 U/L    AST (SGOT) 22 0 - 33 U/L    Alkaline Phosphatase 125 (H) 25 - 100 U/L    Total Bilirubin 0.4 0.3 - 1.2 mg/dL    eGFR Non African Amer 62 >60 mL/min/1.73    Globulin 3.0 gm/dL    A/G Ratio 1.4 (L) 1.5 - 2.5 g/dL    BUN/Creatinine Ratio 15.6 7.0 - 25.0    Anion Gap 6.0 3.0 - 11.0 mmol/L   Lipase    Collection Time: 07/10/18 10:18 AM   Result Value Ref Range    Lipase 41 6 - 51 U/L   BNP    Collection Time: 07/10/18 10:18 AM   Result Value Ref Range    BNP 82.0 0.0 - 100.0 pg/mL   Light Blue Top    Collection Time: 07/10/18 10:18 AM   Result Value Ref Range    Extra Tube hold for add-on    Green Top (Gel)    Collection Time: 07/10/18 10:18 AM   Result Value Ref Range    Extra Tube Hold for add-ons.    Lavender Top    Collection Time: 07/10/18 10:18 AM   Result Value Ref Range    Extra Tube hold for add-on    Gold Top - SST    Collection Time: 07/10/18 10:18 AM   Result Value Ref Range    Extra Tube Hold for add-ons.    CBC Auto Differential    Collection Time: 07/10/18 10:18 AM   Result Value Ref Range    WBC 5.35 3.50 - 10.80 10*3/mm3    RBC 4.37 3.89 - 5.14 10*6/mm3     Hemoglobin 12.7 11.5 - 15.5 g/dL    Hematocrit 39.6 34.5 - 44.0 %    MCV 90.6 80.0 - 99.0 fL    MCH 29.1 27.0 - 31.0 pg    MCHC 32.1 32.0 - 36.0 g/dL    RDW 15.9 (H) 11.3 - 14.5 %    RDW-SD 53.4 37.0 - 54.0 fl    MPV 9.4 6.0 - 12.0 fL    Platelets 188 150 - 450 10*3/mm3    Neutrophil % 54.3 41.0 - 71.0 %    Lymphocyte % 30.7 24.0 - 44.0 %    Monocyte % 11.0 0.0 - 12.0 %    Eosinophil % 3.0 0.0 - 3.0 %    Basophil % 0.6 0.0 - 1.0 %    Immature Grans % 0.4 0.0 - 0.6 %    Neutrophils, Absolute 2.91 1.50 - 8.30 10*3/mm3    Lymphocytes, Absolute 1.64 0.60 - 4.80 10*3/mm3    Monocytes, Absolute 0.59 0.00 - 1.00 10*3/mm3    Eosinophils, Absolute 0.16 0.00 - 0.30 10*3/mm3    Basophils, Absolute 0.03 0.00 - 0.20 10*3/mm3    Immature Grans, Absolute 0.02 0.00 - 0.03 10*3/mm3   POC Troponin, Rapid    Collection Time: 07/10/18 12:27 PM   Result Value Ref Range    Troponin I 0.00 0.00 - 0.07 ng/mL     Note: In addition to lab results from this visit, the labs listed above may include labs taken at another facility or during a different encounter within the last 24 hours. Please correlate lab times with ED admission and discharge times for further clarification of the services performed during this visit.    CT Angiogram Chest With & Without Contrast   Final Result   Pleural-based 9 mm pulmonary nodule at the right lung base   in which follow-up imaging is recommended as indicated. No acute   intrathoracic abnormality. No thoracic aortic dissection, aneurysmal   dilatation or intimal flap.       D:  07/10/2018   E:  07/10/2018               This report was finalized on 7/10/2018 2:09 PM by Dr. Romy Taylor MD.          XR Forearm 2 View Right   Final Result   Degenerative changes seen within the wrist with no evidence   of fracture or dislocation.       D:  07/10/2018   E:  07/10/2018       This report was finalized on 7/10/2018 1:00 PM by Dr. Romy Taylor MD.          XR Humerus Right   Final Result   No acute  bony abnormality is identified.       D:  07/10/2018   E:  07/10/2018       This report was finalized on 7/10/2018 1:00 PM by Dr. Romy Taylor MD.          XR Elbow 2 View Right   Final Result   No acute bony abnormality.       D:  07/10/2018   E:  07/10/2018       This report was finalized on 7/10/2018 1:00 PM by Dr. Romy Taylor MD.          XR Chest 1 View   Final Result   No acute cardiopulmonary disease.       D:  07/10/2018   E:  07/10/2018       This report was finalized on 7/10/2018 1:00 PM by Dr. Romy Taylor MD.          XR Shoulder 2+ View Right   Final Result   No acute bony abnormality.       D:  07/10/2018   E:  07/10/2018       This report was finalized on 7/10/2018 12:23 PM by Dr. Romy Taylor MD.            Vitals:    07/10/18 1025 07/10/18 1030 07/10/18 1244 07/10/18 1415   BP: 150/72 165/77 147/67 138/41   BP Location:    Left arm   Patient Position: Lying   Sitting   Pulse:  58 56 55   Resp:    16   Temp:    97.6 °F (36.4 °C)   TempSrc:    Oral   SpO2:  98% 96% 97%   Weight:       Height:         Medications   iopamidol (ISOVUE-370) 76 % injection 100 mL (80 mL Intravenous Given 7/10/18 1109)   acetaminophen (TYLENOL) tablet 1,000 mg (1,000 mg Oral Given 7/10/18 1252)     ECG/EMG Results (last 24 hours)     Procedure Component Value Units Date/Time    ECG 12 Lead [[id number]] Collected:  07/10/18 1015     Updated:  07/10/18 1021    ECG 12 Lead [[id number]] Collected:  07/10/18 1216     Updated:  07/10/18 1225            MDM    Final diagnoses:   Chronic pain of right upper extremity       Documentation assistance provided by tete Cormier.  Information recorded by the tete was done at my direction and has been verified and validated by me.     Sarwat Cormier  07/10/18 1019       Michael Roper MD  07/10/18 [id number]

## 2018-07-17 ENCOUNTER — OFFICE VISIT (OUTPATIENT)
Dept: SLEEP MEDICINE | Facility: HOSPITAL | Age: 68
End: 2018-07-17

## 2018-07-17 VITALS
OXYGEN SATURATION: 97 % | WEIGHT: 208.8 LBS | SYSTOLIC BLOOD PRESSURE: 125 MMHG | DIASTOLIC BLOOD PRESSURE: 73 MMHG | HEIGHT: 61 IN | HEART RATE: 65 BPM | BODY MASS INDEX: 39.42 KG/M2

## 2018-07-17 DIAGNOSIS — G47.33 OSA (OBSTRUCTIVE SLEEP APNEA): Primary | ICD-10-CM

## 2018-07-17 PROCEDURE — 99213 OFFICE O/P EST LOW 20 MIN: CPT | Performed by: NURSE PRACTITIONER

## 2018-07-17 NOTE — PROGRESS NOTES
Subjective: Follow-up        Chief Complaint:   Chief Complaint   Patient presents with   • Follow-up       HPI:    Daina Flowers is a 67 y.o. female here for follow-up of deacon.Patient states she is doing very well with her machine and is happy with the success she is having with it.  Her greater than 4 hour percentage usage is 90%.  AHI is 5.4.  Her 90% percent pressure 12.3.  [place] screening score is 4 out of 20.  Patient denies fatigue.  Patient denies snoring.  Patient would like to continue on current settings.  She has no complaints.        Current medications are:   Current Outpatient Prescriptions:   •  aspirin 325 MG tablet, Take  by mouth., Disp: , Rfl:   •  atorvastatin (LIPITOR) 40 MG tablet, Take 80 mg by mouth., Disp: , Rfl:   •  clopidogrel (PLAVIX) 75 MG tablet, Take  by mouth daily., Disp: , Rfl:   •  coenzyme Q10 100 MG capsule, Take 100 mg by mouth Daily., Disp: , Rfl:   •  FLUoxetine (PROzac) 20 MG capsule, Take 20 mg by mouth Daily., Disp: , Rfl:   •  folic acid (FOLVITE) 800 MCG tablet, Take 400 mg by mouth daily., Disp: , Rfl:   •  furosemide (LASIX) 40 MG tablet, Take 20 mg by mouth As Needed., Disp: , Rfl:   •  isosorbide dinitrate (ISORDIL) 30 MG tablet, Take  by mouth Daily., Disp: , Rfl:   •  metoprolol succinate XL (TOPROL-XL) 50 MG 24 hr tablet, Take  by mouth daily., Disp: , Rfl:   •  nitroglycerin (NITROSTAT) 0.4 MG SL tablet, Place  under the tongue., Disp: , Rfl:   •  Omega-3 Fatty Acids (FISH OIL) 1000 MG capsule capsule, Take  by mouth daily., Disp: , Rfl:   •  pantoprazole (PROTONIX) 40 MG EC tablet, Take  by mouth daily., Disp: , Rfl:   •  traMADol (ULTRAM) 50 MG tablet, Take 1 tablet by mouth Every 12 (Twelve) Hours As Needed for Moderate Pain ., Disp: 10 tablet, Rfl: 0.      The patient's relevant past medical, surgical, family and social history were reviewed and updated in Epic as appropriate.       Review of Systems   Constitutional: Negative.    HENT: Negative.     Eyes: Positive for visual disturbance.   Gastrointestinal:        Gerd   Musculoskeletal: Positive for arthralgias.   All other systems reviewed and are negative.        Objective:    Physical Exam   Constitutional: She is oriented to person, place, and time. She appears well-developed and well-nourished.   HENT:   Head: Normocephalic and atraumatic.   Mouth/Throat: Oropharynx is clear and moist.   Mallampati grade 3 airway   Eyes: Conjunctivae are normal.   Neck: Neck supple. No thyromegaly present.   Cardiovascular: Normal rate and regular rhythm.    Pulmonary/Chest: Effort normal and breath sounds normal.   Lymphadenopathy:     She has no cervical adenopathy.   Neurological: She is alert and oriented to person, place, and time.   Skin: Skin is warm and dry.   Psychiatric: She has a normal mood and affect. Her behavior is normal. Judgment and thought content normal.   Nursing note and vitals reviewed.        ASSESSMENT/PLAN    Daina was seen today for follow-up.    Diagnoses and all orders for this visit:    GENE (obstructive sleep apnea)  -     CPAP Therapy            1. Counseled patient regarding multimodal approach with healthy nutrition, healthy sleep, regular physical activity, social activities, counseling, and medications. Encouraged to practice lateral sleep position. Avoid alcohol and sedatives close to bedtime.  2. Refilled sleep supplies ×1 year.  3. Return to clinic 1 year for follow-up or when necessary if symptoms warrant.    I have reviewed the results of my evaluation and impression and discussed my recommendations in detail with the patient.      Signed by  SONAL Mistry    July 17, 2018      CC: SONAL Pappas          No ref. provider found

## 2018-07-17 NOTE — PATIENT INSTRUCTIONS

## 2019-04-12 NOTE — PROGRESS NOTES
[facility]  Office Progress Note  Daina Flowers  1950        Visit Date: 04/15/19    PCP: Lupe Tadeo, APRN  [address]  [facility] [address]    IDENTIFICATION: A 68 y.o. female resident of [place], [place].    Chief Complaint: follow up of CAD, HLP, PVD    PROBLEM LIST:   1. Coronary artery disease:  a. Multiple interventions with greater than 60-mm stent to RCA, 2000 to 2006.   b. January 2007, CABG x3 with LIMA to LAD, SVG to PDA, and SVG to posterolateral of the RCA, Dr. Siddiqi.  c. April 2014, LHC: Patent SVG to PDA. Occluded SVG to PL and atretic LIMA. Unchanged from 2011, normal LVEF.   d. 5/17 lexiscan wnl EF 62%  2. Peripheral vascular disease:  a. In 1997, infrarenal abdominal aortic stent per Dr. Spain.  b. In 2006, carotid duplex, nonobstructive disease, minimal plaque noted.  c. April 2008, 5.0 x 27 bifurcational Express stents to iliac, ostium per Dr. العلي.  d. April 2008, AA with runoffs with patent infrarenal aortic stent with no evidence of compromise.  e. Discrepant upper extremity blood pressures with a 20-mm gradient left subclavian stenosis, status post subclavian angiography revealing proximal left subclavian stenosis not felt in need of revascularization at current, January 2011.  f. September 2012, mechanical thrombectomy with stent x2 to the right common iliac artery, stent to the left common iliac artery, bifurcation stent placement to the left external iliac and left common iliac arteries, Dr. Earl.  g. 7/2017 stent to LLE per [facility]   3. Bilateral knee pain, followed in [facility] per orthopedics:  a. Left total knee replacement surgery per Dr. Fierro, September 2015.  4. Hyperlipidemia.  5. Obstructive sleep apnea on CPAP.  6. Nicotine addiction with smoking cessation, 2011.  7. Dyslipidemia.  8. Gastroesophageal reflux disease.  9. Depression, , 2006.   10. Surgical history:  a. Appendectomy.    b. Cholecystectomy.   c. Infrarenal abdominal stent, Dr. Spain, 1997.     Allergies  Allergies   Allergen Reactions   • Bactrim [Sulfamethoxazole-Trimethoprim] Itching   • Crestor [Rosuvastatin Calcium]    • Keflex [Cephalexin] Itching   • Lipitor [Atorvastatin] Other (See Comments)     high-dose with thrush.     • Lisinopril    • Lortab [Hydrocodone-Acetaminophen]    • Percocet [Oxycodone-Acetaminophen]    • Pravachol [Pravastatin Sodium]    • Zocor [Simvastatin] Myalgia     Muscle ache   • Diclofenac Sodium Rash   • Diphenhydramine Rash   • Estradiol Rash     Vaginal cream caused itching and rash   • Prednisone Rash   • Tizanidine Hcl Rash       Current Medications    Current Outpatient Medications:   •  aspirin 325 MG tablet, Take 325 mg by mouth Daily., Disp: , Rfl:   •  atorvastatin (LIPITOR) 40 MG tablet, Take 80 mg by mouth., Disp: , Rfl:   •  clopidogrel (PLAVIX) 75 MG tablet, Take  by mouth daily., Disp: , Rfl:   •  coenzyme Q10 100 MG capsule, Take 100 mg by mouth Daily., Disp: , Rfl:   •  FLUoxetine (PROzac) 20 MG capsule, Take 20 mg by mouth Daily., Disp: , Rfl:   •  folic acid (FOLVITE) 800 MCG tablet, Take 400 mg by mouth daily., Disp: , Rfl:   •  isosorbide dinitrate (ISORDIL) 30 MG tablet, Take  by mouth Daily., Disp: , Rfl:   •  metoprolol succinate XL (TOPROL-XL) 50 MG 24 hr tablet, Take  by mouth daily., Disp: , Rfl:   •  nitroglycerin (NITROSTAT) 0.4 MG SL tablet, Place  under the tongue., Disp: , Rfl:   •  Omega-3 Fatty Acids (FISH OIL) 1000 MG capsule capsule, Take  by mouth daily., Disp: , Rfl:   •  pantoprazole (PROTONIX) 40 MG EC tablet, Take  by mouth daily., Disp: , Rfl:       History of Present Illness   Daina Flowers is a 68 y.o. year old female here for follow up.  She continues to follow up in [place] regards to peripheral vascular disease but has felt well overall.  She is interested in taking CBD oil for some arthritides and asks my opinion.  Pt denies any new chest pain,  "dyspnea, dyspnea on exertion, orthopnea, PND, palpitations, lower extremity edema, or claudication.    ROS:  All systems have been reviewed and are negative with the exception of those mentioned in the HPI.    OBJECTIVE:  Vitals:    04/15/19 1351   BP: 126/84   BP Location: Right arm   Patient Position: Sitting   Pulse: 64   SpO2: 96%   Weight: 96.2 kg (212 lb)   Height: 154.9 cm (61\")     Physical Exam   Constitutional: She appears well-developed and well-nourished.   Neck: Normal range of motion. Neck supple. No hepatojugular reflux and no JVD present. Carotid bruit is not present. No tracheal deviation present. No thyromegaly present.   Cardiovascular: Normal rate, regular rhythm, S1 normal, S2 normal, intact distal pulses and normal pulses. PMI is not displaced. Exam reveals no gallop, no distant heart sounds, no friction rub, no midsystolic click and no opening snap.   No murmur heard.  Pulses:       Radial pulses are 2+ on the right side, and 2+ on the left side.        Dorsalis pedis pulses are 2+ on the right side, and 2+ on the left side.        Posterior tibial pulses are 2+ on the right side, and 2+ on the left side.   Pulmonary/Chest: Effort normal and breath sounds normal. She has no wheezes. She has no rales.   Abdominal: Soft. Bowel sounds are normal. She exhibits no mass. There is no tenderness. There is no guarding.       Diagnostic Data:  Procedures      ASSESSMENT:   Diagnosis Plan   1. Coronary artery disease involving native coronary artery of native heart without angina pectoris     2. Essential hypertension     3. Mixed hyperlipidemia     4. PVD (peripheral vascular disease) with claudication (CMS/[facility])         PLAN:  CAD post remote surgical revascularization with no recurrent anginal equivalent continued medical management    Hypertension controlled current metoprolol historically intolerant to ACE/ARB    Dyslipidemia on statin therapy    PVD with no claudication post revascularization for " follow-up and walking regimen    I have no opinion regarding CBD oil as no randomized control trials have been shown    Lupe Tadeo, SONAL, thank you for referring Ms. Flowers for evaluation.  I have forwarded my electronically generated recommendations to you for review.  Please do not hesitate to call with any questions.      Octavio Barber MD, FACC

## 2019-04-15 ENCOUNTER — OFFICE VISIT (OUTPATIENT)
Dept: CARDIOLOGY | Facility: CLINIC | Age: 69
End: 2019-04-15

## 2019-04-15 VITALS
DIASTOLIC BLOOD PRESSURE: 84 MMHG | SYSTOLIC BLOOD PRESSURE: 126 MMHG | HEIGHT: 61 IN | OXYGEN SATURATION: 96 % | WEIGHT: 212 LBS | BODY MASS INDEX: 40.02 KG/M2 | HEART RATE: 64 BPM

## 2019-04-15 DIAGNOSIS — I10 ESSENTIAL HYPERTENSION: ICD-10-CM

## 2019-04-15 DIAGNOSIS — I25.10 CORONARY ARTERY DISEASE INVOLVING NATIVE CORONARY ARTERY OF NATIVE HEART WITHOUT ANGINA PECTORIS: Primary | ICD-10-CM

## 2019-04-15 DIAGNOSIS — I73.9 PVD (PERIPHERAL VASCULAR DISEASE) WITH CLAUDICATION (HCC): ICD-10-CM

## 2019-04-15 DIAGNOSIS — E78.2 MIXED HYPERLIPIDEMIA: ICD-10-CM

## 2019-04-15 PROCEDURE — 99214 OFFICE O/P EST MOD 30 MIN: CPT | Performed by: INTERNAL MEDICINE

## 2019-04-15 RX ORDER — NITROGLYCERIN 0.4 MG/1
0.4 TABLET SUBLINGUAL
Qty: 20 TABLET | Refills: 3 | Status: SHIPPED | OUTPATIENT
Start: 2019-04-15

## 2019-08-16 ENCOUNTER — OFFICE VISIT (OUTPATIENT)
Dept: SLEEP MEDICINE | Facility: HOSPITAL | Age: 69
End: 2019-08-16

## 2019-08-16 VITALS
SYSTOLIC BLOOD PRESSURE: 140 MMHG | HEIGHT: 61 IN | DIASTOLIC BLOOD PRESSURE: 74 MMHG | HEART RATE: 60 BPM | OXYGEN SATURATION: 96 % | WEIGHT: 210.2 LBS | BODY MASS INDEX: 39.69 KG/M2

## 2019-08-16 DIAGNOSIS — G47.33 OSA (OBSTRUCTIVE SLEEP APNEA): Primary | ICD-10-CM

## 2019-08-16 PROCEDURE — 99212 OFFICE O/P EST SF 10 MIN: CPT | Performed by: NURSE PRACTITIONER

## 2019-08-16 NOTE — PROGRESS NOTES
Chief Complaint:   Chief Complaint   Patient presents with   • Follow-up       HPI:    Daina Flowers is a 69 y.o. female here for follow-up of sleep apnea.  Patient was last seen 7/17/2018.  Patient states she is doing well with CPAP therapy.  Patient is sleeping 8 hours nightly and feels refreshed upon awakening.  Patient does not snore.  Patient does not nap.  Patient has an [place] score of 2/24.  Patient has no concerns or complaints today and wishes to continue CPAP.        Current medications are:   Current Outpatient Medications:   •  aspirin 325 MG tablet, Take 325 mg by mouth Daily., Disp: , Rfl:   •  atorvastatin (LIPITOR) 40 MG tablet, Take 80 mg by mouth., Disp: , Rfl:   •  clopidogrel (PLAVIX) 75 MG tablet, Take  by mouth daily., Disp: , Rfl:   •  coenzyme Q10 100 MG capsule, Take 100 mg by mouth Daily., Disp: , Rfl:   •  FLUoxetine (PROzac) 20 MG capsule, Take 20 mg by mouth Daily., Disp: , Rfl:   •  folic acid (FOLVITE) 800 MCG tablet, Take 400 mg by mouth daily., Disp: , Rfl:   •  isosorbide dinitrate (ISORDIL) 30 MG tablet, Take  by mouth Daily., Disp: , Rfl:   •  metoprolol succinate XL (TOPROL-XL) 50 MG 24 hr tablet, Take  by mouth daily., Disp: , Rfl:   •  nitroglycerin (NITROSTAT) 0.4 MG SL tablet, Place 1 tablet under the tongue Every 5 (Five) Minutes As Needed for Chest Pain., Disp: 20 tablet, Rfl: 3  •  Omega-3 Fatty Acids (FISH OIL) 1000 MG capsule capsule, Take  by mouth daily., Disp: , Rfl:   •  pantoprazole (PROTONIX) 40 MG EC tablet, Take  by mouth daily., Disp: , Rfl: .      The patient's relevant past medical, surgical, family and social history were reviewed and updated in Epic as appropriate.       Review of Systems   Eyes: Positive for visual disturbance.   Respiratory: Positive for apnea.    Gastrointestinal:        Heartburn   Musculoskeletal: Positive for arthralgias.   Psychiatric/Behavioral: Positive for dysphoric mood and sleep disturbance.   All other systems reviewed  and are negative.        Objective:    Physical Exam   Constitutional: She is oriented to person, place, and time. She appears well-developed and well-nourished.   HENT:   Head: Normocephalic and atraumatic.   Mouth/Throat: Oropharynx is clear and moist.   Mallampati 3 anatomy   Eyes: Conjunctivae are normal.   Neck: Neck supple. No thyromegaly present.   Cardiovascular: Normal rate and regular rhythm.   Pulmonary/Chest: Effort normal and breath sounds normal.   Lymphadenopathy:     She has no cervical adenopathy.   Neurological: She is alert and oriented to person, place, and time.   Skin: Skin is warm and dry.   Psychiatric: She has a normal mood and affect. Her behavior is normal. Judgment and thought content normal.   Nursing note and vitals reviewed.  180/1 80 days of use.  Greater than 4-hour use 91.7.  AHI 5.3.  90% pressure 12.3.  Download reviewed with patient.      ASSESSMENT/PLAN    Daina was seen today for follow-up.    Diagnoses and all orders for this visit:    GENE (obstructive sleep apnea)  -     CPAP Therapy            1. Counseled patient regarding multimodal approach with healthy nutrition, healthy sleep, regular physical activity, social activities, counseling, and medications. Encouraged to practice lateral sleep position. Avoid alcohol and sedatives close to bedtime.  2.   Refill supplies x1 year.  Return to clinic 1 year sooner symptoms warrant.  I have reviewed the results of my evaluation and impression and discussed my recommendations in detail with the patient.      Signed by  SONAL Mistry    August 16, 2019      CC: Lupe Tadeo, SONAL          No ref. provider found

## 2020-03-09 ENCOUNTER — OFFICE VISIT (OUTPATIENT)
Dept: CARDIOLOGY | Facility: CLINIC | Age: 70
End: 2020-03-09

## 2020-03-09 VITALS
DIASTOLIC BLOOD PRESSURE: 64 MMHG | HEART RATE: 64 BPM | WEIGHT: 213 LBS | HEIGHT: 61 IN | BODY MASS INDEX: 40.22 KG/M2 | SYSTOLIC BLOOD PRESSURE: 132 MMHG | OXYGEN SATURATION: 96 %

## 2020-03-09 DIAGNOSIS — I25.10 CORONARY ARTERY DISEASE INVOLVING NATIVE CORONARY ARTERY OF NATIVE HEART WITHOUT ANGINA PECTORIS: Primary | ICD-10-CM

## 2020-03-09 DIAGNOSIS — I73.9 PVD (PERIPHERAL VASCULAR DISEASE) WITH CLAUDICATION (HCC): ICD-10-CM

## 2020-03-09 DIAGNOSIS — E78.2 MIXED HYPERLIPIDEMIA: ICD-10-CM

## 2020-03-09 DIAGNOSIS — I10 ESSENTIAL HYPERTENSION: ICD-10-CM

## 2020-03-09 PROCEDURE — 99214 OFFICE O/P EST MOD 30 MIN: CPT | Performed by: INTERNAL MEDICINE

## 2020-03-09 NOTE — PROGRESS NOTES
[facility] at [facility]  Office Progress Note  Daina Flowers  1950  [address]  [facility] [place] [address]       Visit Date: 03/09/20    PCP: Lupe Tadeo, APRN  [address]  [facility] [address]    IDENTIFICATION: A 69 y.o. female resident of [place], [place].    PROBLEM LIST:   1. Coronary artery disease:  a. Multiple interventions with greater than 60-mm stent to RCA, 2000 to 2006.   b. January 2007, CABG x3 with LIMA to LAD, SVG to PDA, and SVG to posterolateral of the RCA, Dr. Siddiqi.  c. April 2014, C: Patent SVG to PDA. Occluded SVG to PL and atretic LIMA. Unchanged from 2011, normal LVEF.   d. 5/17 lexiscan wnl EF 62%  2. Peripheral vascular disease:  a. In 1997, infrarenal abdominal aortic stent per Dr. Spain.  b. In 2006, carotid duplex, nonobstructive disease, minimal plaque noted.  c. April 2008, 5.0 x 27 bifurcational Express stents to iliac, ostium per Dr. العلي.  d. April 2008, AA with runoffs with patent infrarenal aortic stent with no evidence of compromise.  e. Discrepant upper extremity blood pressures with a 20-mm gradient left subclavian stenosis, status post subclavian angiography revealing proximal left subclavian stenosis not felt in need of revascularization at current, January 2011.  f. September 2012, mechanical thrombectomy with stent x2 to the right common iliac artery, stent to the left common iliac artery, bifurcation stent placement to the left external iliac and left common iliac arteries, Dr. Earl.  g. 7/2017 stent to LLE per [facility]   3. Bilateral knee pain, followed in [facility] per orthopedics:  a. Left total knee replacement surgery per Dr. Fierro, September 2015.  4. Hyperlipidemia.  5. Obstructive sleep apnea on CPAP.  6. Nicotine addiction with smoking cessation, 2011.  7. Dyslipidemia.  8. Gastroesophageal reflux disease.  9. Depression, , 2006.   10. Surgical history:  a. Appendectomy.    b. Cholecystectomy.   c. nfrarenal abdominal stent, Dr. Spain, 1997.    Chief Complaint   Patient presents with   • Coronary Artery Disease       Allergies  Allergies   Allergen Reactions   • Bactrim [Sulfamethoxazole-Trimethoprim] Itching   • Crestor [Rosuvastatin Calcium]    • Keflex [Cephalexin] Itching   • Lipitor [Atorvastatin] Other (See Comments)     high-dose with thrush.     • Lisinopril    • Lortab [Hydrocodone-Acetaminophen]    • Percocet [Oxycodone-Acetaminophen]    • Pravachol [Pravastatin Sodium]    • Zocor [Simvastatin] Myalgia     Muscle ache   • Diclofenac Sodium Rash   • Diphenhydramine Rash   • Estradiol Rash     Vaginal cream caused itching and rash   • Prednisone Rash   • Tizanidine Hcl Rash       Current Medications    Current Outpatient Medications:   •  aspirin 325 MG tablet, Take 325 mg by mouth Daily., Disp: , Rfl:   •  atorvastatin (LIPITOR) 40 MG tablet, Take 80 mg by mouth., Disp: , Rfl:   •  clopidogrel (PLAVIX) 75 MG tablet, Take  by mouth daily., Disp: , Rfl:   •  coenzyme Q10 100 MG capsule, Take 100 mg by mouth Daily., Disp: , Rfl:   •  FLUoxetine (PROzac) 20 MG capsule, Take 20 mg by mouth Daily., Disp: , Rfl:   •  folic acid (FOLVITE) 800 MCG tablet, Take 400 mg by mouth daily., Disp: , Rfl:   •  isosorbide dinitrate (ISORDIL) 30 MG tablet, Take  by mouth Daily., Disp: , Rfl:   •  metoprolol succinate XL (TOPROL-XL) 50 MG 24 hr tablet, Take  by mouth daily., Disp: , Rfl:   •  nitroglycerin (NITROSTAT) 0.4 MG SL tablet, Place 1 tablet under the tongue Every 5 (Five) Minutes As Needed for Chest Pain., Disp: 20 tablet, Rfl: 3  •  Omega-3 Fatty Acids (FISH OIL) 1000 MG capsule capsule, Take  by mouth daily., Disp: , Rfl:   •  pantoprazole (PROTONIX) 40 MG EC tablet, Take  by mouth daily., Disp: , Rfl:       History of Present Illness   Daina Flowers is a 69 y.o. year old female here for follow up.    Pt denies any chest pain, dyspnea, dyspnea on exertion, orthopnea, PND,  "palpitations, lower extremity edema, or claudication.  Recent serologies per PCP reportedly with acceptable blood sugar and cholesterol panel.  She is utilize no nitroglycerin since last visit and does some activities above that of simple  daily living.    OBJECTIVE:  Vitals:    03/09/20 1337   BP: 132/64   BP Location: Right arm   Patient Position: Sitting   Pulse: 64   SpO2: 96%   Weight: 96.6 kg (213 lb)   Height: 154.9 cm (61\")     Physical Exam   Constitutional: She appears well-developed and well-nourished.   Neck: Normal range of motion. Neck supple. No hepatojugular reflux and no JVD present. Carotid bruit is not present. No tracheal deviation present. No thyromegaly present.   Cardiovascular: Normal rate, regular rhythm, S1 normal, S2 normal, intact distal pulses and normal pulses. PMI is not displaced. Exam reveals no gallop, no distant heart sounds, no friction rub, no midsystolic click and no opening snap.   No murmur heard.  Pulses:       Radial pulses are 2+ on the right side, and 2+ on the left side.        Dorsalis pedis pulses are 2+ on the right side, and 2+ on the left side.        Posterior tibial pulses are 2+ on the right side, and 2+ on the left side.   Pulmonary/Chest: Effort normal and breath sounds normal. She has no wheezes. She has no rales.   Abdominal: Soft. Bowel sounds are normal. She exhibits no mass. There is no tenderness. There is no guarding.       Diagnostic Data:  Procedures      ASSESSMENT:   Diagnosis Plan   1. Coronary artery disease involving native coronary artery of native heart without angina pectoris     2. PVD (peripheral vascular disease) with claudication (CMS/HCC)     3. Essential hypertension     4. Mixed hyperlipidemia         PLAN:  CAD post remote revascularization no anginal equivalent continue medical management    PVD status post remote PCI no claudication continued walking regimen    Hypertension controlled metoprolol    Dyslipidemia on statin " therapy    Counseled need for weight reduction dietary restriction    Acceptable cardiac risk for proposed basal cell resection with holding Plavix 5 days prior continue periprocedural aspirin however decreased to 81 mg daily indefinitely    Lupe Tadeo, APRN, thank you for referring Ms. Flowers for evaluation.  I have forwarded my electronically generated recommendations to you for review.  Please do not hesitate to call with any questions.      Octavio Barber MD, FACC

## 2020-08-17 ENCOUNTER — OFFICE VISIT (OUTPATIENT)
Dept: SLEEP MEDICINE | Facility: HOSPITAL | Age: 70
End: 2020-08-17

## 2020-08-17 VITALS
SYSTOLIC BLOOD PRESSURE: 149 MMHG | HEIGHT: 61 IN | DIASTOLIC BLOOD PRESSURE: 81 MMHG | OXYGEN SATURATION: 96 % | BODY MASS INDEX: 40.48 KG/M2 | WEIGHT: 214.4 LBS | HEART RATE: 65 BPM

## 2020-08-17 DIAGNOSIS — G47.33 OSA (OBSTRUCTIVE SLEEP APNEA): Primary | ICD-10-CM

## 2020-08-17 PROCEDURE — 99212 OFFICE O/P EST SF 10 MIN: CPT | Performed by: NURSE PRACTITIONER

## 2020-08-17 NOTE — PROGRESS NOTES
"    Chief Complaint:   Chief Complaint   Patient presents with   • Follow-up       HPI:    Daina Flowers is a 70 y.o. female here for follow-up of sleep apnea.  Patient was last seen 8/16/2019.  Patient states she does need a new machine.  Patient's machine is 5+ years old and the \"teeth\" where she puts in her chip are now bending and breaking.  Patient is now making loud noises throughout the night as well.  Patient settings are to continue at 8-15 and we will send this to her DME today.  Patient is sleeping 6+ hours nightly and does feel rested upon awakening.  Patient goes to sleep within 15 minutes and does not get up during the night.  Patient has an [place] score of 2/24.  Patient has no concerns regarding CPAP therapy and we will see her back after new machine is issued.        Current medications are:   Current Outpatient Medications:   •  aspirin 325 MG tablet, Take 81 mg by mouth Daily., Disp: , Rfl:   •  atorvastatin (LIPITOR) 40 MG tablet, Take 80 mg by mouth., Disp: , Rfl:   •  clopidogrel (PLAVIX) 75 MG tablet, Take  by mouth daily., Disp: , Rfl:   •  coenzyme Q10 100 MG capsule, Take 100 mg by mouth Daily., Disp: , Rfl:   •  FLUoxetine (PROzac) 20 MG capsule, Take 20 mg by mouth Daily., Disp: , Rfl:   •  folic acid (FOLVITE) 800 MCG tablet, Take 400 mg by mouth daily., Disp: , Rfl:   •  isosorbide dinitrate (ISORDIL) 30 MG tablet, Take  by mouth Daily., Disp: , Rfl:   •  metoprolol succinate XL (TOPROL-XL) 50 MG 24 hr tablet, Take  by mouth daily., Disp: , Rfl:   •  nitroglycerin (NITROSTAT) 0.4 MG SL tablet, Place 1 tablet under the tongue Every 5 (Five) Minutes As Needed for Chest Pain., Disp: 20 tablet, Rfl: 3  •  Omega-3 Fatty Acids (FISH OIL) 1000 MG capsule capsule, Take  by mouth daily., Disp: , Rfl:   •  pantoprazole (PROTONIX) 40 MG EC tablet, Take  by mouth daily., Disp: , Rfl: .      The patient's relevant past medical, surgical, family and social history were reviewed and updated in " Epic as appropriate.       Review of Systems   Eyes: Positive for visual disturbance.   Respiratory: Positive for apnea.    Gastrointestinal:        Heartburn   Musculoskeletal: Positive for arthralgias.   Psychiatric/Behavioral: Positive for dysphoric mood and sleep disturbance.   All other systems reviewed and are negative.        Objective:    Physical Exam   Constitutional: She is oriented to person, place, and time. She appears well-developed and well-nourished.   HENT:   Head: Normocephalic and atraumatic.   Class 3 airway   Eyes: Pupils are equal, round, and reactive to light. Conjunctivae are normal.   Neck: No tracheal deviation present.   Cardiovascular: Normal rate.   Neurological: She is alert and oriented to person, place, and time.   Skin: No erythema. No pallor.   Psychiatric: She has a normal mood and affect. Her behavior is normal. Judgment and thought content normal.   Vitals reviewed.  90/90 days of use.  Greater than 4-hour use 74.490% pressure 12.1 AHI 5.3.  Download reviewed with patient.      ASSESSMENT/PLAN    Daina was seen today for follow-up.    Diagnoses and all orders for this visit:    GENE (obstructive sleep apnea)  -     CPAP Therapy            1. Counseled patient regarding multimodal approach with healthy nutrition, healthy sleep, regular physical activity, social activities, counseling, and medications. Encouraged to practice lateral sleep position. Avoid alcohol and sedatives close to bedtime.  2. Refill supplies x1 year.  Return to clinic 1 year or sooner symptoms warrant.    I have reviewed the results of my evaluation and impression and discussed my recommendations in detail with the patient.      Signed by  SONAL Mistry    August 17, 2020      CC: Lupe Tadeo APRN          No ref. provider found

## 2020-09-29 ENCOUNTER — TELEPHONE (OUTPATIENT)
Dept: CARDIOLOGY | Facility: CLINIC | Age: 70
End: 2020-09-29

## 2020-09-30 ENCOUNTER — TELEPHONE (OUTPATIENT)
Dept: CARDIOLOGY | Facility: CLINIC | Age: 70
End: 2020-09-30

## 2020-09-30 DIAGNOSIS — I25.10 CORONARY ARTERY DISEASE INVOLVING NATIVE CORONARY ARTERY OF NATIVE HEART WITHOUT ANGINA PECTORIS: Primary | ICD-10-CM

## 2020-09-30 DIAGNOSIS — I73.9 PERIPHERAL VASCULAR DISEASE (HCC): ICD-10-CM

## 2020-09-30 NOTE — TELEPHONE ENCOUNTER
Spoke with patient and advised per  we have ordered arterial dopplers and our office will call to schedule that testing and then we will see her in office a week later after testing. Pt verbalized understanding

## 2020-10-19 ENCOUNTER — OFFICE VISIT (OUTPATIENT)
Dept: SLEEP MEDICINE | Facility: HOSPITAL | Age: 70
End: 2020-10-19

## 2020-10-19 VITALS
WEIGHT: 217.6 LBS | OXYGEN SATURATION: 97 % | DIASTOLIC BLOOD PRESSURE: 66 MMHG | BODY MASS INDEX: 41.08 KG/M2 | SYSTOLIC BLOOD PRESSURE: 155 MMHG | HEIGHT: 61 IN | HEART RATE: 56 BPM

## 2020-10-19 DIAGNOSIS — G47.33 OSA (OBSTRUCTIVE SLEEP APNEA): Primary | ICD-10-CM

## 2020-10-19 PROCEDURE — 99212 OFFICE O/P EST SF 10 MIN: CPT | Performed by: NURSE PRACTITIONER

## 2020-10-19 NOTE — PROGRESS NOTES
Chief Complaint:   Chief Complaint   Patient presents with   • Follow-up       HPI:    Daina Flowers is a 70 y.o. female here for follow-up of sleep apnea.  Patient was last seen 8/17/2021 patient did need a new machine at that time.  Patient states she is doing well with CPAP therapy.  Patient is sleeping 6 to 8 hours nightly and does feel refreshed upon awakening.  Patient will go to sleep within 5 minutes and does not get up during the night.  Patient has an [place] score of 3/24.  We have discussed a 90% pressure of 12.0 today as well as AHI of 6.0.  Patient does request we do not change her settings as she feels she is doing very well at this time.  Patient has no concerns or complaints regarding CPAP and wishes to continue.        Current medications are:   Current Outpatient Medications:   •  aspirin 325 MG tablet, Take 81 mg by mouth Daily., Disp: , Rfl:   •  atorvastatin (LIPITOR) 40 MG tablet, Take 80 mg by mouth., Disp: , Rfl:   •  clopidogrel (PLAVIX) 75 MG tablet, Take  by mouth daily., Disp: , Rfl:   •  coenzyme Q10 100 MG capsule, Take 100 mg by mouth Daily., Disp: , Rfl:   •  FLUoxetine (PROzac) 20 MG capsule, Take 20 mg by mouth Daily., Disp: , Rfl:   •  folic acid (FOLVITE) 800 MCG tablet, Take 400 mg by mouth daily., Disp: , Rfl:   •  isosorbide dinitrate (ISORDIL) 30 MG tablet, Take  by mouth Daily., Disp: , Rfl:   •  metoprolol succinate XL (TOPROL-XL) 50 MG 24 hr tablet, Take  by mouth daily., Disp: , Rfl:   •  nitroglycerin (NITROSTAT) 0.4 MG SL tablet, Place 1 tablet under the tongue Every 5 (Five) Minutes As Needed for Chest Pain., Disp: 20 tablet, Rfl: 3  •  Omega-3 Fatty Acids (FISH OIL) 1000 MG capsule capsule, Take  by mouth daily., Disp: , Rfl:   •  pantoprazole (PROTONIX) 40 MG EC tablet, Take  by mouth daily., Disp: , Rfl: .      The patient's relevant past medical, surgical, family and social history were reviewed and updated in Epic as appropriate.       Review of Systems    Eyes: Positive for visual disturbance.   Respiratory: Positive for apnea.    Gastrointestinal:        Heartburn   Musculoskeletal: Positive for arthralgias.   Psychiatric/Behavioral: Positive for dysphoric mood and sleep disturbance.   All other systems reviewed and are negative.        Objective:    Physical Exam  Constitutional:       Appearance: Normal appearance. She is obese.   HENT:      Head: Normocephalic and atraumatic.      Mouth/Throat:      Mouth: Mucous membranes are moist.      Pharynx: Oropharynx is clear.      Comments: CLASS 3 AIRWAY  Eyes:      Conjunctiva/sclera: Conjunctivae normal.   Pulmonary:      Effort: Pulmonary effort is normal. No respiratory distress.   Skin:     General: Skin is warm and dry.   Neurological:      Mental Status: She is oriented to person, place, and time.   Psychiatric:         Mood and Affect: Mood normal.         Behavior: Behavior normal.         Thought Content: Thought content normal.         Judgment: Judgment normal.     56/58 days of use  Greater than 4 hours 91.4  90% pressure 12.0  AHI of 6.0  Settings 8-15  Last study 8/23/2015 had AHI of 18.8        ASSESSMENT/PLAN    Diagnoses and all orders for this visit:    1. GENE (obstructive sleep apnea) (Primary)  -     CPAP Therapy            1. Counseled patient regarding multimodal approach with healthy nutrition, healthy sleep, regular physical activity, social activities, counseling, and medications. Encouraged to practice lateral  sleep position. Avoid alcohol and sedatives close to bedtime.  2. Refill supplies x1 year.  Return to clinic 1 year or sooner symptoms warrant.    I have reviewed the results of my evaluation and impression and discussed my recommendations in detail with the patient.      Signed by  SONAL Mistry    October 19, 2020      CC: Lupe Tadeo APRN          No ref. provider found

## 2020-10-23 ENCOUNTER — HOSPITAL ENCOUNTER (OUTPATIENT)
Dept: CARDIOLOGY | Facility: HOSPITAL | Age: 70
Discharge: HOME | End: 2020-10-23
Admitting: INTERNAL MEDICINE

## 2020-10-23 DIAGNOSIS — I25.10 CORONARY ARTERY DISEASE INVOLVING NATIVE CORONARY ARTERY OF NATIVE HEART WITHOUT ANGINA PECTORIS: ICD-10-CM

## 2020-10-23 DIAGNOSIS — I73.9 PERIPHERAL VASCULAR DISEASE (HCC): ICD-10-CM

## 2020-10-23 LAB
BH CV LOWER ARTERIAL LEFT ABI RATIO: 1.08
BH CV LOWER ARTERIAL LEFT DORSALIS PEDIS SYS MAX: 153 MMHG
BH CV LOWER ARTERIAL LEFT GREAT TOE SYS MAX: 126 MMHG
BH CV LOWER ARTERIAL LEFT HIGH THIGH SYS MAX: 255 MMHG
BH CV LOWER ARTERIAL LEFT LOW THIGH SYS MAX: 253 MMHG
BH CV LOWER ARTERIAL LEFT POPLITEAL SYS MAX: 199 MMHG
BH CV LOWER ARTERIAL LEFT POST TIBIAL SYS MAX: 185 MMHG
BH CV LOWER ARTERIAL LEFT TBI RATIO: 0.74
BH CV LOWER ARTERIAL RIGHT ABI RATIO: 1.03
BH CV LOWER ARTERIAL RIGHT DORSALIS PEDIS SYS MAX: 154 MMHG
BH CV LOWER ARTERIAL RIGHT GREAT TOE SYS MAX: 131 MMHG
BH CV LOWER ARTERIAL RIGHT HIGH THIGH SYS MAX: 255 MMHG
BH CV LOWER ARTERIAL RIGHT LOW THIGH SYS MAX: 215 MMHG
BH CV LOWER ARTERIAL RIGHT POPLITEAL SYS MAX: 181 MMHG
BH CV LOWER ARTERIAL RIGHT POST TIBIAL SYS MAX: 176 MMHG
BH CV LOWER ARTERIAL RIGHT TBI RATIO: 0.77
UPPER ARTERIAL LEFT ARM BRACHIAL SYS MAX: 171 MMHG
UPPER ARTERIAL RIGHT ARM BRACHIAL SYS MAX: 169 MMHG

## 2020-10-23 PROCEDURE — 93923 UPR/LXTR ART STDY 3+ LVLS: CPT | Performed by: INTERNAL MEDICINE

## 2020-10-23 PROCEDURE — 93923 UPR/LXTR ART STDY 3+ LVLS: CPT

## 2020-10-26 ENCOUNTER — TELEPHONE (OUTPATIENT)
Dept: CARDIOLOGY | Facility: CLINIC | Age: 70
End: 2020-10-26

## 2020-10-26 NOTE — TELEPHONE ENCOUNTER
Spoke with patient and advised per  that her recent dopplers reveal no blockages in both LE. Pt relieved to hear this and verbalized understanding. Pt to see us on 11/09 for reg check up

## 2020-11-09 ENCOUNTER — OFFICE VISIT (OUTPATIENT)
Dept: CARDIOLOGY | Facility: CLINIC | Age: 70
End: 2020-11-09

## 2020-11-09 VITALS
DIASTOLIC BLOOD PRESSURE: 68 MMHG | HEART RATE: 62 BPM | OXYGEN SATURATION: 97 % | WEIGHT: 222 LBS | SYSTOLIC BLOOD PRESSURE: 132 MMHG | BODY MASS INDEX: 41.91 KG/M2 | HEIGHT: 61 IN

## 2020-11-09 DIAGNOSIS — I73.9 PVD (PERIPHERAL VASCULAR DISEASE) WITH CLAUDICATION (HCC): ICD-10-CM

## 2020-11-09 DIAGNOSIS — I25.10 CORONARY ARTERY DISEASE INVOLVING NATIVE CORONARY ARTERY OF NATIVE HEART WITHOUT ANGINA PECTORIS: Primary | ICD-10-CM

## 2020-11-09 DIAGNOSIS — E78.2 MIXED HYPERLIPIDEMIA: ICD-10-CM

## 2020-11-09 DIAGNOSIS — I10 ESSENTIAL HYPERTENSION: ICD-10-CM

## 2020-11-09 PROCEDURE — 99214 OFFICE O/P EST MOD 30 MIN: CPT | Performed by: INTERNAL MEDICINE

## 2020-11-09 PROCEDURE — 93000 ELECTROCARDIOGRAM COMPLETE: CPT | Performed by: INTERNAL MEDICINE

## 2020-11-09 NOTE — PROGRESS NOTES
[facility] at [facility]  Office Progress Note  Daina ESPINOZA Kristie  1950  [address]  [facility] [place] [address]       Visit Date: 11/09/20    PCP: Lupe Tadeo, APRN  [address]  [facility] [address]    IDENTIFICATION: A 70 y.o. female resident of [place], [place].    PROBLEM LIST:   1. Coronary artery disease:  a. Multiple interventions with greater than 60-mm stent to RCA, 2000 to 2006.   b. January 2007, CABG x3 with LIMA to LAD, SVG to PDA, and SVG to posterolateral of the RCA, Dr. Siddiqi.  c. April 2014, [facility]: Patent SVG to PDA. Occluded SVG to PL and atretic LIMA. Unchanged from 2011, normal LVEF.   d. 5/17 lexiscan wnl EF 62%  2. Peripheral vascular disease:  a. In 1997, infrarenal abdominal aortic stent per Dr. Spain.  b. In 2006, carotid duplex, nonobstructive disease, minimal plaque noted.  c. April 2008, 5.0 x 27 bifurcational Express stents to iliac, ostium per Dr. العلي.  d. April 2008, AA with runoffs with patent infrarenal aortic stent with no evidence of compromise.  e. Discrepant upper extremity blood pressures with a 20-mm gradient left subclavian stenosis, status post subclavian angiography revealing proximal left subclavian stenosis not felt in need of revascularization at current, January 2011.  f. September 2012, mechanical thrombectomy with stent x2 to the right common iliac artery, stent to the left common iliac artery, bifurcation stent placement to the left external iliac and left common iliac arteries, Dr. Earl.  g. 7/2017 stent to LLE per [facility]   h. 10/20 LE LINDY wnl  3. Bilateral knee pain, followed in [facility] per orthopedics:  a. Left total knee replacement surgery per Dr. Fierro, September 2015.  4. Hyperlipidemia.  5. Obstructive sleep apnea on CPAP.  6. Nicotine addiction with smoking cessation, 2011.  7. Dyslipidemia.  8. Gastroesophageal reflux disease.  9. Depression, , 2006.   10. Surgical  history:  a. Appendectomy.   b. Cholecystectomy.   c. nfrarenal abdominal stent, Dr. Spain, 1997.    Chief Complaint   Patient presents with   • Coronary Artery Disease       Allergies  Allergies   Allergen Reactions   • Bactrim [Sulfamethoxazole-Trimethoprim] Itching   • Crestor [Rosuvastatin Calcium]    • Keflex [Cephalexin] Itching   • Lipitor [Atorvastatin] Other (See Comments)     high-dose with thrush.     • Lisinopril    • Lortab [Hydrocodone-Acetaminophen]    • Percocet [Oxycodone-Acetaminophen]    • Pravachol [Pravastatin Sodium]    • Zocor [Simvastatin] Myalgia     Muscle ache   • Diclofenac Sodium Rash   • Diphenhydramine Rash   • Estradiol Rash     Vaginal cream caused itching and rash   • Prednisone Rash   • Tizanidine Hcl Rash       Current Medications    Current Outpatient Medications:   •  aspirin 325 MG tablet, Take 81 mg by mouth Daily., Disp: , Rfl:   •  atorvastatin (LIPITOR) 40 MG tablet, Take 80 mg by mouth., Disp: , Rfl:   •  clopidogrel (PLAVIX) 75 MG tablet, Take  by mouth daily., Disp: , Rfl:   •  coenzyme Q10 100 MG capsule, Take 100 mg by mouth Daily., Disp: , Rfl:   •  FLUoxetine (PROzac) 20 MG capsule, Take 20 mg by mouth Daily., Disp: , Rfl:   •  folic acid (FOLVITE) 800 MCG tablet, Take 400 mg by mouth daily., Disp: , Rfl:   •  isosorbide dinitrate (ISORDIL) 30 MG tablet, Take  by mouth Daily., Disp: , Rfl:   •  metoprolol succinate XL (TOPROL-XL) 50 MG 24 hr tablet, Take  by mouth daily., Disp: , Rfl:   •  nitroglycerin (NITROSTAT) 0.4 MG SL tablet, Place 1 tablet under the tongue Every 5 (Five) Minutes As Needed for Chest Pain., Disp: 20 tablet, Rfl: 3  •  Omega-3 Fatty Acids (FISH OIL) 1000 MG capsule capsule, Take  by mouth daily., Disp: , Rfl:   •  pantoprazole (PROTONIX) 40 MG EC tablet, Take  by mouth daily., Disp: , Rfl:       History of Present Illness   Daina Flowers is a 70 y.o. year old female here for follow up.    Pt limited activity she has leg discomfort.  She can  "walk through the house.  She has had weight gain during the Covid pandemic as she has not been very active.  No cardiac symptoms    OBJECTIVE:  Vitals:    11/09/20 0857   BP: 132/68   BP Location: Right arm   Patient Position: Sitting   Pulse: 62   SpO2: 97%   Weight: 101 kg (222 lb)   Height: 154.9 cm (61\")     Physical Exam   Constitutional: She appears well-developed and well-nourished.   Neck: Normal range of motion. Neck supple. No hepatojugular reflux and no JVD present. Carotid bruit is not present. No tracheal deviation present. No thyromegaly present.   Cardiovascular: Normal rate, regular rhythm, S1 normal, S2 normal, intact distal pulses and normal pulses. PMI is not displaced. Exam reveals no gallop, no distant heart sounds, no friction rub, no midsystolic click and no opening snap.   No murmur heard.  Pulses:       Radial pulses are 2+ on the right side and 2+ on the left side.        Dorsalis pedis pulses are 2+ on the right side and 2+ on the left side.        Posterior tibial pulses are 2+ on the right side and 2+ on the left side.   Pulmonary/Chest: Effort normal and breath sounds normal. She has no wheezes. She has no rales.   Abdominal: Soft. Bowel sounds are normal. She exhibits no mass. There is no abdominal tenderness. There is no guarding.       Diagnostic Data:    ECG 12 Lead    Date/Time: 11/9/2020 9:31 AM  Performed by: Octavio Barber MD  Authorized by: Octavio Barber MD   Previous ECG: no previous ECG available  Rhythm: sinus rhythm  BPM: 57    Clinical impression: non-specific ECG              ASSESSMENT:   Diagnosis Plan   1. Coronary artery disease involving native coronary artery of native heart without angina pectoris     2. PVD (peripheral vascular disease) with claudication (CMS/HCC)     3. Essential hypertension     4. Mixed hyperlipidemia         PLAN:  CAD post remote revascularization no anginal equivalent continue medical management    PVD status post remote PCI  Continued " attempts walking regimen    Hypertension controlled metoprolol    Dyslipidemia on statin therapy    Counseled need for weight reduction dietary restriction    Acceptable cardiac risk for proposed basal cell resection with holding Plavix 5 days prior continue periprocedural aspirin however decreased to 81 mg daily indefinitely    Lupe Tadeo, SONAL, thank you for referring Ms. Flowers for evaluation.  I have forwarded my electronically generated recommendations to you for review.  Please do not hesitate to call with any questions.      Octavio Barber MD, FACC

## 2021-05-24 ENCOUNTER — OFFICE VISIT (OUTPATIENT)
Dept: CARDIOLOGY | Facility: CLINIC | Age: 71
End: 2021-05-24

## 2021-05-24 VITALS
SYSTOLIC BLOOD PRESSURE: 118 MMHG | BODY MASS INDEX: 40.4 KG/M2 | HEIGHT: 61 IN | DIASTOLIC BLOOD PRESSURE: 60 MMHG | WEIGHT: 214 LBS | OXYGEN SATURATION: 96 % | HEART RATE: 62 BPM

## 2021-05-24 DIAGNOSIS — I73.9 PVD (PERIPHERAL VASCULAR DISEASE) WITH CLAUDICATION (HCC): ICD-10-CM

## 2021-05-24 DIAGNOSIS — I10 ESSENTIAL HYPERTENSION: ICD-10-CM

## 2021-05-24 DIAGNOSIS — I20.9 ANGINA PECTORIS (HCC): Primary | ICD-10-CM

## 2021-05-24 DIAGNOSIS — E78.2 MIXED HYPERLIPIDEMIA: ICD-10-CM

## 2021-05-24 PROCEDURE — 99214 OFFICE O/P EST MOD 30 MIN: CPT | Performed by: INTERNAL MEDICINE

## 2021-05-24 NOTE — PROGRESS NOTES
[facility] at [facility]  Office Progress Note  Daina ESPINOZA Kristie  1950  [address]  [facility] [place] [address]       Visit Date: 05/24/21    PCP: Lupe Tadeo, APRN  [address]  [facility] [address]    IDENTIFICATION: A 70 y.o. female resident of [place], [place].    PROBLEM LIST:   1. Coronary artery disease:  a. Multiple interventions with greater than 60-mm stent to RCA, 2000 to 2006.   b. January 2007, CABG x3 with LIMA to LAD, SVG to PDA, and SVG to posterolateral of the RCA, Dr. Siddiqi.  c. April 2014, [facility]: Patent SVG to PDA. Occluded SVG to PL and atretic LIMA. Unchanged from 2011, normal LVEF.   d. 5/17 lexiscan wnl EF 62%  2. Peripheral vascular disease:  a. In 1997, infrarenal abdominal aortic stent per Dr. Spain.  b. In 2006, carotid duplex, nonobstructive disease, minimal plaque noted.  c. April 2008, 5.0 x 27 bifurcational Express stents to iliac, ostium per Dr. العلي.  d. April 2008, AA with runoffs with patent infrarenal aortic stent with no evidence of compromise.  e. Discrepant upper extremity blood pressures with a 20-mm gradient left subclavian stenosis, status post subclavian angiography revealing proximal left subclavian stenosis not felt in need of revascularization at current, January 2011.  f. September 2012, mechanical thrombectomy with stent x2 to the right common iliac artery, stent to the left common iliac artery, bifurcation stent placement to the left external iliac and left common iliac arteries, Dr. Earl.  g. 7/2017 stent to LLE per [facility]   h. 10/20 LE LINDY wnl  3. Bilateral knee pain, followed in [facility] per orthopedics:  a. Left total knee replacement surgery per Dr. Fierro, September 2015.  4. Hyperlipidemia.  5. Obstructive sleep apnea on CPAP.  6. Nicotine addiction with smoking cessation, 2011.  7. Dyslipidemia.  8. Gastroesophageal reflux disease.  9. Depression, , 2006.   10. Surgical  history:  a. Appendectomy.   b. Cholecystectomy.   c. nfrarenal abdominal stent, Dr. Spain, 1997.    Chief Complaint   Patient presents with   • Coronary Artery Disease       Allergies  Allergies   Allergen Reactions   • Bactrim [Sulfamethoxazole-Trimethoprim] Itching   • Crestor [Rosuvastatin Calcium]    • Keflex [Cephalexin] Itching   • Lipitor [Atorvastatin] Other (See Comments)     high-dose with thrush.     • Lisinopril    • Lortab [Hydrocodone-Acetaminophen]    • Percocet [Oxycodone-Acetaminophen]    • Pravachol [Pravastatin Sodium]    • Zocor [Simvastatin] Myalgia     Muscle ache   • Diclofenac Sodium Rash   • Diphenhydramine Rash   • Estradiol Rash     Vaginal cream caused itching and rash   • Prednisone Rash   • Tizanidine Hcl Rash       Current Medications    Current Outpatient Medications:   •  aspirin 325 MG tablet, Take 81 mg by mouth Daily., Disp: , Rfl:   •  atorvastatin (LIPITOR) 40 MG tablet, Take 80 mg by mouth., Disp: , Rfl:   •  clopidogrel (PLAVIX) 75 MG tablet, Take  by mouth daily., Disp: , Rfl:   •  coenzyme Q10 100 MG capsule, Take 100 mg by mouth Daily., Disp: , Rfl:   •  FLUoxetine (PROzac) 20 MG capsule, Take 20 mg by mouth Daily., Disp: , Rfl:   •  folic acid (FOLVITE) 800 MCG tablet, Take 400 mg by mouth daily., Disp: , Rfl:   •  isosorbide dinitrate (ISORDIL) 30 MG tablet, Take  by mouth Daily., Disp: , Rfl:   •  metoprolol succinate XL (TOPROL-XL) 50 MG 24 hr tablet, Take  by mouth daily., Disp: , Rfl:   •  nitroglycerin (NITROSTAT) 0.4 MG SL tablet, Place 1 tablet under the tongue Every 5 (Five) Minutes As Needed for Chest Pain., Disp: 20 tablet, Rfl: 3  •  Omega-3 Fatty Acids (FISH OIL) 1000 MG capsule capsule, Take  by mouth daily., Disp: , Rfl:   •  pantoprazole (PROTONIX) 40 MG EC tablet, Take  by mouth daily., Disp: , Rfl:       History of Present Illness   Daina Flowers is a 70 y.o. year old female here for follow up.  Recurrent exertional chest fullness shortness of  "breath.  She is also having some claudication type symptoms.  She is been compliant with her medications      OBJECTIVE:  Vitals:    05/24/21 1044   BP: 118/60   BP Location: Left arm   Patient Position: Sitting   Pulse: 62   SpO2: 96%   Weight: 97.1 kg (214 lb)   Height: 154.9 cm (61\")     Physical Exam  Constitutional:       Appearance: She is well-developed.   Neck:      Thyroid: No thyromegaly.      Vascular: No carotid bruit, hepatojugular reflux or JVD.      Trachea: No tracheal deviation.   Cardiovascular:      Rate and Rhythm: Normal rate and regular rhythm.      Chest Wall: PMI is not displaced.      Pulses: Normal pulses and intact distal pulses. No midsystolic click and no opening snap.           Radial pulses are 2+ on the right side and 2+ on the left side.        Dorsalis pedis pulses are 2+ on the right side and 2+ on the left side.        Posterior tibial pulses are 2+ on the right side and 2+ on the left side.      Heart sounds: S1 normal and S2 normal. Heart sounds not distant. No murmur heard.   No friction rub. No gallop.    Pulmonary:      Effort: Pulmonary effort is normal.      Breath sounds: Normal breath sounds. No wheezing or rales.   Abdominal:      General: Bowel sounds are normal.      Palpations: Abdomen is soft. There is no mass.      Tenderness: There is no abdominal tenderness. There is no guarding.   Musculoskeletal:      Cervical back: Normal range of motion and neck supple.         Diagnostic Data:  Procedures      ASSESSMENT:   Diagnosis Plan   1. Angina pectoris (CMS/HCC)     2. PVD (peripheral vascular disease) with claudication (CMS/HCC)     3. Essential hypertension     4. Mixed hyperlipidemia         PLAN:  CAD post remote revascularization recurrent anginal equivalent stress test indicated    PVD status post remote PCI  Continued attempts walking regimen    Hypertension controlled metoprolol    Dyslipidemia on statin therapy    Counseled need for weight reduction dietary " Lupe Tolbert, SONAL, thank you for referring Ms. Flowers for evaluation.  I have forwarded my electronically generated recommendations to you for review.  Please do not hesitate to call with any questions.      Octavio Barber MD, FACC

## 2021-06-02 ENCOUNTER — HOSPITAL ENCOUNTER (OUTPATIENT)
Dept: CARDIOLOGY | Facility: HOSPITAL | Age: 71
Discharge: HOME | End: 2021-06-02
Admitting: INTERNAL MEDICINE

## 2021-06-02 VITALS — WEIGHT: 214 LBS | BODY MASS INDEX: 40.4 KG/M2 | HEIGHT: 61 IN

## 2021-06-02 DIAGNOSIS — I20.9 ANGINA PECTORIS (HCC): ICD-10-CM

## 2021-06-02 PROCEDURE — 78452 HT MUSCLE IMAGE SPECT MULT: CPT

## 2021-06-02 PROCEDURE — 25010000002 REGADENOSON 0.4 MG/5ML SOLUTION: Performed by: INTERNAL MEDICINE

## 2021-06-02 PROCEDURE — 93017 CV STRESS TEST TRACING ONLY: CPT

## 2021-06-02 PROCEDURE — 78452 HT MUSCLE IMAGE SPECT MULT: CPT | Performed by: INTERNAL MEDICINE

## 2021-06-02 PROCEDURE — A9500 TC99M SESTAMIBI: HCPCS | Performed by: INTERNAL MEDICINE

## 2021-06-02 PROCEDURE — 0 TECHNETIUM SESTAMIBI: Performed by: INTERNAL MEDICINE

## 2021-06-02 PROCEDURE — 93018 CV STRESS TEST I&R ONLY: CPT | Performed by: INTERNAL MEDICINE

## 2021-06-02 RX ADMIN — REGADENOSON 0.4 MG: 0.08 INJECTION, SOLUTION INTRAVENOUS at 10:44

## 2021-06-02 RX ADMIN — TECHNETIUM TC 99M SESTAMIBI 1 DOSE: 1 INJECTION INTRAVENOUS at 08:45

## 2021-06-02 RX ADMIN — TECHNETIUM TC 99M SESTAMIBI 1 DOSE: 1 INJECTION INTRAVENOUS at 10:53

## 2021-06-03 ENCOUNTER — TELEPHONE (OUTPATIENT)
Dept: CARDIOLOGY | Facility: CLINIC | Age: 71
End: 2021-06-03

## 2021-06-03 LAB
BH CV REST NUCLEAR ISOTOPE DOSE: 9.8 MCI
BH CV STRESS BP STAGE 2: NORMAL
BH CV STRESS BP STAGE 4: NORMAL
BH CV STRESS COMMENTS STAGE 1: NORMAL
BH CV STRESS DOSE REGADENOSON STAGE 1: 0.4
BH CV STRESS DURATION MIN STAGE 1: 1
BH CV STRESS DURATION MIN STAGE 2: 1
BH CV STRESS DURATION MIN STAGE 3: 1
BH CV STRESS DURATION MIN STAGE 4: 1
BH CV STRESS DURATION SEC STAGE 1: 0
BH CV STRESS DURATION SEC STAGE 2: 0
BH CV STRESS DURATION SEC STAGE 3: 0
BH CV STRESS DURATION SEC STAGE 4: 0
BH CV STRESS HR STAGE 1: 61
BH CV STRESS HR STAGE 2: 80
BH CV STRESS HR STAGE 3: 76
BH CV STRESS HR STAGE 4: 72
BH CV STRESS NUCLEAR ISOTOPE DOSE: 31 MCI
BH CV STRESS PROTOCOL 1: NORMAL
BH CV STRESS RECOVERY BP: NORMAL MMHG
BH CV STRESS RECOVERY HR: 70 BPM
BH CV STRESS STAGE 1: 1
BH CV STRESS STAGE 2: 2
BH CV STRESS STAGE 3: 3
BH CV STRESS STAGE 4: 4
LV EF NUC BP: 75 %
MAXIMAL PREDICTED HEART RATE: 149 BPM
PERCENT MAX PREDICTED HR: 54.36 %
STRESS BASELINE BP: NORMAL MMHG
STRESS BASELINE HR: 61 BPM
STRESS O2 SAT REST: 95 %
STRESS PERCENT HR: 64 %
STRESS POST PEAK BP: NORMAL MMHG
STRESS POST PEAK HR: 81 BPM
STRESS TARGET HR: 127 BPM

## 2021-10-19 ENCOUNTER — OFFICE VISIT (OUTPATIENT)
Dept: SLEEP MEDICINE | Facility: HOSPITAL | Age: 71
End: 2021-10-19

## 2021-10-19 VITALS
WEIGHT: 216.4 LBS | DIASTOLIC BLOOD PRESSURE: 73 MMHG | HEIGHT: 61 IN | HEART RATE: 54 BPM | BODY MASS INDEX: 40.86 KG/M2 | OXYGEN SATURATION: 96 % | SYSTOLIC BLOOD PRESSURE: 174 MMHG

## 2021-10-19 DIAGNOSIS — G47.33 OSA (OBSTRUCTIVE SLEEP APNEA): Primary | ICD-10-CM

## 2021-10-19 PROCEDURE — 99213 OFFICE O/P EST LOW 20 MIN: CPT | Performed by: NURSE PRACTITIONER

## 2021-10-19 NOTE — PROGRESS NOTES
Chief Complaint:   Chief Complaint   Patient presents with   • Follow-up       HPI:    Daina Flowers is a 71 y.o. female here for follow-up of sleep apnea.  Patient has a history of dyslipidemia, hyperlipidemia, PVD, CAD, obesity, GERD, depression, sleep apnea on CPAP.  Patient was last seen 10/19/2020.  Patient is sleeping 6 to 8 hours nightly and does feel rested upon awakening.  Patient will go to sleep within 5 minutes and does not get up during the night.  Patient has no concerns or complaints regarding CPAP use and wishes to continue.        Current medications are:   Current Outpatient Medications:   •  aspirin 325 MG tablet, Take 81 mg by mouth Daily., Disp: , Rfl:   •  atorvastatin (LIPITOR) 40 MG tablet, Take 80 mg by mouth., Disp: , Rfl:   •  clopidogrel (PLAVIX) 75 MG tablet, Take  by mouth daily., Disp: , Rfl:   •  coenzyme Q10 100 MG capsule, Take 100 mg by mouth Daily., Disp: , Rfl:   •  FLUoxetine (PROzac) 20 MG capsule, Take 20 mg by mouth Daily., Disp: , Rfl:   •  folic acid (FOLVITE) 800 MCG tablet, Take 400 mg by mouth daily., Disp: , Rfl:   •  isosorbide dinitrate (ISORDIL) 30 MG tablet, Take  by mouth Daily., Disp: , Rfl:   •  metoprolol succinate XL (TOPROL-XL) 50 MG 24 hr tablet, Take  by mouth daily., Disp: , Rfl:   •  nitroglycerin (NITROSTAT) 0.4 MG SL tablet, Place 1 tablet under the tongue Every 5 (Five) Minutes As Needed for Chest Pain., Disp: 20 tablet, Rfl: 3  •  Omega-3 Fatty Acids (FISH OIL) 1000 MG capsule capsule, Take  by mouth daily., Disp: , Rfl:   •  pantoprazole (PROTONIX) 40 MG EC tablet, Take  by mouth daily., Disp: , Rfl: .      The patient's relevant past medical, surgical, family and social history were reviewed and updated in Epic as appropriate.       Review of Systems   Eyes: Positive for visual disturbance.   Respiratory: Positive for apnea.    Gastrointestinal:        Heartburn   Musculoskeletal: Positive for arthralgias.   Psychiatric/Behavioral: Positive  for dysphoric mood and sleep disturbance.   All other systems reviewed and are negative.        Objective:    Physical Exam  Constitutional:       Appearance: Normal appearance.   HENT:      Head: Normocephalic and atraumatic.      Mouth/Throat:      Pharynx: Oropharynx is clear.      Comments: Mallampati 3 anatomy  Eyes:      Conjunctiva/sclera: Conjunctivae normal.   Cardiovascular:      Rate and Rhythm: Normal rate and regular rhythm.   Pulmonary:      Effort: Pulmonary effort is normal.      Breath sounds: Normal breath sounds.   Skin:     General: Skin is warm and dry.   Neurological:      Mental Status: She is alert and oriented to person, place, and time.   Psychiatric:         Mood and Affect: Mood normal.         Behavior: Behavior normal.         Thought Content: Thought content normal.         Judgment: Judgment normal.       89/90 days of use  Greater than 4-hour use 80%  90% pressure 12.1  AHI 5.2  Settings 8-15    ASSESSMENT/PLAN    Diagnoses and all orders for this visit:    1. GENE (obstructive sleep apnea) (Primary)  -     CPAP Therapy            1. Counseled patient regarding multimodal approach with healthy nutrition, healthy sleep, regular physical activity, social activities, counseling, and medications. Encouraged to practice lateral sleep position. Avoid alcohol and sedatives close to bedtime.  2.   Refill supplies x1 year.  Return to clinic 1 year or sooner symptoms warrant.  I have reviewed the results of my evaluation and impression and discussed my recommendations in detail with the patient.      Signed by  SONAL Mistry    October 19, 2021      CC: Lupe Tadeo APRN          No ref. provider found

## 2022-03-04 ENCOUNTER — OFFICE VISIT (OUTPATIENT)
Dept: CARDIOLOGY | Facility: CLINIC | Age: 72
End: 2022-03-04

## 2022-03-04 ENCOUNTER — HOSPITAL ENCOUNTER (OUTPATIENT)
Facility: HOSPITAL | Age: 72
Setting detail: HOSPITAL OUTPATIENT SURGERY
Discharge: HOME | End: 2022-03-04
Attending: INTERNAL MEDICINE | Admitting: INTERNAL MEDICINE

## 2022-03-04 VITALS
RESPIRATION RATE: 16 BRPM | TEMPERATURE: 97 F | DIASTOLIC BLOOD PRESSURE: 55 MMHG | SYSTOLIC BLOOD PRESSURE: 110 MMHG | HEIGHT: 61 IN | BODY MASS INDEX: 40.59 KG/M2 | OXYGEN SATURATION: 94 % | WEIGHT: 215 LBS | HEART RATE: 57 BPM

## 2022-03-04 VITALS
DIASTOLIC BLOOD PRESSURE: 68 MMHG | WEIGHT: 214.8 LBS | SYSTOLIC BLOOD PRESSURE: 156 MMHG | BODY MASS INDEX: 40.55 KG/M2 | HEIGHT: 61 IN | OXYGEN SATURATION: 97 % | HEART RATE: 56 BPM

## 2022-03-04 DIAGNOSIS — I25.110 CORONARY ARTERY DISEASE INVOLVING NATIVE CORONARY ARTERY OF NATIVE HEART WITH UNSTABLE ANGINA PECTORIS: ICD-10-CM

## 2022-03-04 DIAGNOSIS — I20.0 UNSTABLE ANGINA: ICD-10-CM

## 2022-03-04 DIAGNOSIS — E78.2 MIXED HYPERLIPIDEMIA: ICD-10-CM

## 2022-03-04 DIAGNOSIS — E66.9 OBESITY (BMI 30-39.9): ICD-10-CM

## 2022-03-04 DIAGNOSIS — I10 PRIMARY HYPERTENSION: ICD-10-CM

## 2022-03-04 DIAGNOSIS — I25.10 CORONARY ARTERY DISEASE INVOLVING NATIVE CORONARY ARTERY OF NATIVE HEART WITHOUT ANGINA PECTORIS: Primary | ICD-10-CM

## 2022-03-04 DIAGNOSIS — I20.0 UNSTABLE ANGINA: Primary | ICD-10-CM

## 2022-03-04 LAB
ACT BLD: 535 SECONDS (ref 82–152)
ALBUMIN SERPL-MCNC: 4.5 G/DL (ref 3.5–5.2)
ALBUMIN/GLOB SERPL: 1.6 G/DL
ALP SERPL-CCNC: 128 U/L (ref 39–117)
ALT SERPL W P-5'-P-CCNC: 14 U/L (ref 1–33)
ANION GAP SERPL CALCULATED.3IONS-SCNC: 13 MMOL/L (ref 5–15)
AST SERPL-CCNC: 17 U/L (ref 1–32)
BASE EXCESS BLDA CALC-SCNC: 2 MMOL/L (ref -5–5)
BILIRUB SERPL-MCNC: 0.5 MG/DL (ref 0–1.2)
BUN SERPL-MCNC: 13 MG/DL (ref 8–23)
BUN/CREAT SERPL: 14.1 (ref 7–25)
CA-I BLDA-SCNC: 1.23 MMOL/L (ref 1.2–1.32)
CALCIUM SPEC-SCNC: 9.4 MG/DL (ref 8.6–10.5)
CATH EF ESTIMATED: 55 %
CHLORIDE SERPL-SCNC: 101 MMOL/L (ref 98–107)
CHOLEST SERPL-MCNC: 166 MG/DL (ref 0–200)
CO2 BLDA-SCNC: 28 MMOL/L (ref 24–29)
CO2 SERPL-SCNC: 24 MMOL/L (ref 22–29)
CREAT BLDA-MCNC: 0.9 MG/DL (ref 0.6–1.3)
CREAT SERPL-MCNC: 0.92 MG/DL (ref 0.57–1)
DEPRECATED RDW RBC AUTO: 50 FL (ref 37–54)
EGFRCR SERPLBLD CKD-EPI 2021: 66.3 ML/MIN/1.73
ERYTHROCYTE [DISTWIDTH] IN BLOOD BY AUTOMATED COUNT: 14.6 % (ref 12.3–15.4)
GLOBULIN UR ELPH-MCNC: 2.9 GM/DL
GLUCOSE BLDC GLUCOMTR-MCNC: 109 MG/DL (ref 70–130)
GLUCOSE SERPL-MCNC: 107 MG/DL (ref 65–99)
HBA1C MFR BLD: 5.5 % (ref 4.8–5.6)
HCO3 BLDA-SCNC: 27.1 MMOL/L (ref 22–26)
HCT VFR BLD AUTO: 39.1 % (ref 34–46.6)
HCT VFR BLDA CALC: 35 % (ref 38–51)
HDLC SERPL-MCNC: 54 MG/DL (ref 40–60)
HGB BLD-MCNC: 12.6 G/DL (ref 12–15.9)
HGB BLDA-MCNC: 11.9 G/DL (ref 12–17)
LDLC SERPL CALC-MCNC: 91 MG/DL (ref 0–100)
LDLC/HDLC SERPL: 1.64 {RATIO}
MCH RBC QN AUTO: 30.1 PG (ref 26.6–33)
MCHC RBC AUTO-ENTMCNC: 32.2 G/DL (ref 31.5–35.7)
MCV RBC AUTO: 93.5 FL (ref 79–97)
PCO2 BLDA: 47.3 MM HG (ref 35–45)
PH BLDA: 7.36 PH UNITS (ref 7.35–7.6)
PLATELET # BLD AUTO: 177 10*3/MM3 (ref 140–450)
PMV BLD AUTO: 10 FL (ref 6–12)
PO2 BLDA: 18 MMHG (ref 80–105)
POTASSIUM BLDA-SCNC: 4.3 MMOL/L (ref 3.5–4.9)
POTASSIUM SERPL-SCNC: 4.6 MMOL/L (ref 3.5–5.2)
PROT SERPL-MCNC: 7.4 G/DL (ref 6–8.5)
RBC # BLD AUTO: 4.18 10*6/MM3 (ref 3.77–5.28)
SAO2 % BLDA: 25 % (ref 95–98)
SODIUM BLD-SCNC: 139 MMOL/L (ref 138–146)
SODIUM SERPL-SCNC: 138 MMOL/L (ref 136–145)
TRIGL SERPL-MCNC: 118 MG/DL (ref 0–150)
VLDLC SERPL-MCNC: 21 MG/DL (ref 5–40)
WBC NRBC COR # BLD: 5.41 10*3/MM3 (ref 3.4–10.8)

## 2022-03-04 PROCEDURE — 93571 IV DOP VEL&/PRESS C FLO 1ST: CPT | Performed by: INTERNAL MEDICINE

## 2022-03-04 PROCEDURE — 25010000002 MIDAZOLAM PER 1 MG: Performed by: INTERNAL MEDICINE

## 2022-03-04 PROCEDURE — 85014 HEMATOCRIT: CPT

## 2022-03-04 PROCEDURE — C1725 CATH, TRANSLUMIN NON-LASER: HCPCS | Performed by: INTERNAL MEDICINE

## 2022-03-04 PROCEDURE — 25010000002 FENTANYL CITRATE (PF) 50 MCG/ML SOLUTION: Performed by: INTERNAL MEDICINE

## 2022-03-04 PROCEDURE — C1894 INTRO/SHEATH, NON-LASER: HCPCS | Performed by: INTERNAL MEDICINE

## 2022-03-04 PROCEDURE — 85347 COAGULATION TIME ACTIVATED: CPT

## 2022-03-04 PROCEDURE — 99153 MOD SED SAME PHYS/QHP EA: CPT | Performed by: INTERNAL MEDICINE

## 2022-03-04 PROCEDURE — 80053 COMPREHEN METABOLIC PANEL: CPT | Performed by: NURSE PRACTITIONER

## 2022-03-04 PROCEDURE — 82947 ASSAY GLUCOSE BLOOD QUANT: CPT

## 2022-03-04 PROCEDURE — 92928 PRQ TCAT PLMT NTRAC ST 1 LES: CPT | Performed by: INTERNAL MEDICINE

## 2022-03-04 PROCEDURE — 93459 L HRT ART/GRFT ANGIO: CPT | Performed by: INTERNAL MEDICINE

## 2022-03-04 PROCEDURE — 84132 ASSAY OF SERUM POTASSIUM: CPT

## 2022-03-04 PROCEDURE — 0 IOPAMIDOL PER 1 ML: Performed by: INTERNAL MEDICINE

## 2022-03-04 PROCEDURE — 93000 ELECTROCARDIOGRAM COMPLETE: CPT | Performed by: INTERNAL MEDICINE

## 2022-03-04 PROCEDURE — 25010000002 ONDANSETRON PER 1 MG: Performed by: INTERNAL MEDICINE

## 2022-03-04 PROCEDURE — 99214 OFFICE O/P EST MOD 30 MIN: CPT | Performed by: INTERNAL MEDICINE

## 2022-03-04 PROCEDURE — 84295 ASSAY OF SERUM SODIUM: CPT

## 2022-03-04 PROCEDURE — C1887 CATHETER, GUIDING: HCPCS | Performed by: INTERNAL MEDICINE

## 2022-03-04 PROCEDURE — 85027 COMPLETE CBC AUTOMATED: CPT | Performed by: NURSE PRACTITIONER

## 2022-03-04 PROCEDURE — 80061 LIPID PANEL: CPT | Performed by: NURSE PRACTITIONER

## 2022-03-04 PROCEDURE — C1769 GUIDE WIRE: HCPCS | Performed by: INTERNAL MEDICINE

## 2022-03-04 PROCEDURE — 82803 BLOOD GASES ANY COMBINATION: CPT

## 2022-03-04 PROCEDURE — C1874 STENT, COATED/COV W/DEL SYS: HCPCS | Performed by: INTERNAL MEDICINE

## 2022-03-04 PROCEDURE — 99152 MOD SED SAME PHYS/QHP 5/>YRS: CPT | Performed by: INTERNAL MEDICINE

## 2022-03-04 PROCEDURE — 82330 ASSAY OF CALCIUM: CPT

## 2022-03-04 PROCEDURE — 25010000002 HEPARIN (PORCINE) PER 1000 UNITS: Performed by: INTERNAL MEDICINE

## 2022-03-04 PROCEDURE — C1760 CLOSURE DEV, VASC: HCPCS | Performed by: INTERNAL MEDICINE

## 2022-03-04 PROCEDURE — C9600 PERC DRUG-EL COR STENT SING: HCPCS | Performed by: INTERNAL MEDICINE

## 2022-03-04 PROCEDURE — 83036 HEMOGLOBIN GLYCOSYLATED A1C: CPT | Performed by: INTERNAL MEDICINE

## 2022-03-04 PROCEDURE — 82565 ASSAY OF CREATININE: CPT

## 2022-03-04 DEVICE — XIENCE SKYPOINT™ EVEROLIMUS ELUTING CORONARY STENT SYSTEM 3.25 MM X 15 MM / RAPID-EXCHANGE
Type: IMPLANTABLE DEVICE | Status: FUNCTIONAL
Brand: XIENCE SKYPOINT™

## 2022-03-04 RX ORDER — HEPARIN SODIUM 1000 [USP'U]/ML
INJECTION, SOLUTION INTRAVENOUS; SUBCUTANEOUS AS NEEDED
Status: DISCONTINUED | OUTPATIENT
Start: 2022-03-04 | End: 2022-03-04 | Stop reason: HOSPADM

## 2022-03-04 RX ORDER — FERROUS SULFATE TAB EC 324 MG (65 MG FE EQUIVALENT) 324 (65 FE) MG
324 TABLET DELAYED RESPONSE ORAL
COMMUNITY

## 2022-03-04 RX ORDER — NITROGLYCERIN 0.4 MG/1
0.4 TABLET SUBLINGUAL
Status: DISCONTINUED | OUTPATIENT
Start: 2022-03-04 | End: 2022-03-04 | Stop reason: HOSPADM

## 2022-03-04 RX ORDER — TIOTROPIUM BROMIDE INHALATION SPRAY 3.12 UG/1
2 SPRAY, METERED RESPIRATORY (INHALATION) DAILY PRN
COMMUNITY
End: 2022-09-01

## 2022-03-04 RX ORDER — ONDANSETRON 2 MG/ML
4 INJECTION INTRAMUSCULAR; INTRAVENOUS EVERY 6 HOURS PRN
Status: DISCONTINUED | OUTPATIENT
Start: 2022-03-04 | End: 2022-03-04 | Stop reason: HOSPADM

## 2022-03-04 RX ORDER — FUROSEMIDE 40 MG/1
40 TABLET ORAL DAILY PRN
COMMUNITY

## 2022-03-04 RX ORDER — MIDAZOLAM HYDROCHLORIDE 1 MG/ML
INJECTION INTRAMUSCULAR; INTRAVENOUS AS NEEDED
Status: DISCONTINUED | OUTPATIENT
Start: 2022-03-04 | End: 2022-03-04 | Stop reason: HOSPADM

## 2022-03-04 RX ORDER — ACETAMINOPHEN 325 MG/1
650 TABLET ORAL EVERY 4 HOURS PRN
Status: DISCONTINUED | OUTPATIENT
Start: 2022-03-04 | End: 2022-03-04 | Stop reason: HOSPADM

## 2022-03-04 RX ORDER — LIDOCAINE HYDROCHLORIDE 10 MG/ML
INJECTION, SOLUTION EPIDURAL; INFILTRATION; INTRACAUDAL; PERINEURAL AS NEEDED
Status: DISCONTINUED | OUTPATIENT
Start: 2022-03-04 | End: 2022-03-04 | Stop reason: HOSPADM

## 2022-03-04 RX ORDER — SODIUM CHLORIDE 0.9 % (FLUSH) 0.9 %
1-10 SYRINGE (ML) INJECTION AS NEEDED
Status: DISCONTINUED | OUTPATIENT
Start: 2022-03-04 | End: 2022-03-04 | Stop reason: HOSPADM

## 2022-03-04 RX ORDER — FENTANYL CITRATE 50 UG/ML
INJECTION, SOLUTION INTRAMUSCULAR; INTRAVENOUS AS NEEDED
Status: DISCONTINUED | OUTPATIENT
Start: 2022-03-04 | End: 2022-03-04 | Stop reason: HOSPADM

## 2022-03-04 RX ORDER — ASPIRIN 325 MG
325 TABLET, DELAYED RELEASE (ENTERIC COATED) ORAL ONCE
Status: COMPLETED | OUTPATIENT
Start: 2022-03-04 | End: 2022-03-04

## 2022-03-04 RX ORDER — NEBIVOLOL 10 MG/1
10 TABLET ORAL DAILY
COMMUNITY
End: 2022-09-01

## 2022-03-04 RX ORDER — CLOPIDOGREL BISULFATE 75 MG/1
TABLET ORAL AS NEEDED
Status: DISCONTINUED | OUTPATIENT
Start: 2022-03-04 | End: 2022-03-04 | Stop reason: HOSPADM

## 2022-03-04 RX ORDER — SODIUM CHLORIDE 0.9 % (FLUSH) 0.9 %
3 SYRINGE (ML) INJECTION EVERY 12 HOURS SCHEDULED
Status: DISCONTINUED | OUTPATIENT
Start: 2022-03-04 | End: 2022-03-04 | Stop reason: HOSPADM

## 2022-03-04 RX ORDER — ATORVASTATIN CALCIUM 80 MG/1
80 TABLET, FILM COATED ORAL NIGHTLY
Qty: 30 TABLET | Refills: 11 | Status: SHIPPED | OUTPATIENT
Start: 2022-03-04

## 2022-03-04 RX ORDER — SODIUM CHLORIDE 9 MG/ML
1-3 INJECTION, SOLUTION INTRAVENOUS CONTINUOUS
Status: DISCONTINUED | OUTPATIENT
Start: 2022-03-04 | End: 2022-03-04 | Stop reason: HOSPADM

## 2022-03-04 RX ADMIN — SODIUM CHLORIDE 3 ML/KG/HR: 9 INJECTION, SOLUTION INTRAVENOUS at 13:45

## 2022-03-04 RX ADMIN — ONDANSETRON 4 MG: 2 INJECTION INTRAMUSCULAR; INTRAVENOUS at 17:43

## 2022-03-04 RX ADMIN — ASPIRIN 325 MG: 325 TABLET, COATED ORAL at 13:56

## 2022-03-04 NOTE — PROGRESS NOTES
[facility] at [facility]  Office Progress Note  Daina ESPINOZA Kristie  1950  [address]  [facility] [place] [address]       Visit Date: 03/04/22    PCP: Lupe Tadeo, APRN  [address] [facility] [address]  [facility] [address]    IDENTIFICATION: A 71 y.o. female resident of [place], [place].    PROBLEM LIST:   1. Coronary artery disease:  a. Multiple interventions with greater than 60-mm stent to RCA, 2000 to 2006.   b. January 2007, CABG x3 with LIMA to LAD, SVG to PDA, and SVG to posterolateral of the RCA, Dr. Siddiqi.  c. April 2014, [facility]: Patent SVG to PDA. Occluded SVG to PL(L-R collats) and atretic LIMA. Unchanged from 2011, normal LVEF. FFR- LAD, 60% CX-med Rx  d. 5/17 lexiscan wnl EF 62%  e. 6/21 MPS wnl EF 70%  2. Peripheral vascular disease:  a. In 1997, infrarenal abdominal aortic stent per Dr. Spain.  b. In 2006, carotid duplex, nonobstructive disease, minimal plaque noted.  c. April 2008, 5.0 x 27 bifurcational Express stents to iliac, ostium per Dr. العلي.  d. April 2008, AA with runoffs with patent infrarenal aortic stent with no evidence of compromise.  e. Discrepant upper extremity blood pressures with a 20-mm gradient left subclavian stenosis, status post subclavian angiography revealing proximal left subclavian stenosis not felt in need of revascularization at current, January 2011.  f. September 2012, mechanical thrombectomy with stent x2 to the right common iliac artery, stent to the left common iliac artery, bifurcation stent placement to the left external iliac and left common iliac arteries, Dr. Earl.  g. 7/2017 stent to LLE per [facility]   h. 10/20 LE LINDY wnl  3. Bilateral knee pain, followed in [facility] per orthopedics:  a. Left total knee replacement surgery per Dr. Fierro, September 2015.  4. Hyperlipidemia.  5. Obstructive sleep apnea on CPAP.  6. Nicotine addiction with smoking cessation, 2011.  7. Dyslipidemia.  8. Gastroesophageal reflux  disease.  9. Depression, , 2006.   10. Surgical history:  a. Appendectomy.   b. Cholecystectomy.   c. infrarenal abdominal stent, Dr. Spain, 1997.    Chief Complaint   Patient presents with   • Coronary artery disease involving native coronary artery of        Allergies  Allergies   Allergen Reactions   • Bactrim [Sulfamethoxazole-Trimethoprim] Itching   • Crestor [Rosuvastatin Calcium]    • Keflex [Cephalexin] Itching   • Lipitor [Atorvastatin] Other (See Comments)     high-dose with thrush.     • Lisinopril    • Lortab [Hydrocodone-Acetaminophen]    • Percocet [Oxycodone-Acetaminophen]    • Pravachol [Pravastatin Sodium]    • Zocor [Simvastatin] Myalgia     Muscle ache   • Diclofenac Sodium Rash   • Diphenhydramine Rash   • Estradiol Rash     Vaginal cream caused itching and rash   • Prednisone Rash   • Tizanidine Hcl Rash       Current Medications    Current Outpatient Medications:   •  aspirin 325 MG tablet, Take 81 mg by mouth Daily., Disp: , Rfl:   •  atorvastatin (LIPITOR) 40 MG tablet, Take 80 mg by mouth., Disp: , Rfl:   •  clopidogrel (PLAVIX) 75 MG tablet, Take  by mouth daily., Disp: , Rfl:   •  coenzyme Q10 100 MG capsule, Take 100 mg by mouth Daily., Disp: , Rfl:   •  ferrous sulfate 324 (65 Fe) MG tablet delayed-release EC tablet, Take 324 mg by mouth Daily With Breakfast., Disp: , Rfl:   •  FLUoxetine (PROzac) 20 MG capsule, Take 20 mg by mouth Daily., Disp: , Rfl:   •  folic acid (FOLVITE) 800 MCG tablet, Take 400 mg by mouth daily., Disp: , Rfl:   •  furosemide (LASIX) 40 MG tablet, Take 40 mg by mouth 2 (Two) Times a Day As Needed., Disp: , Rfl:   •  isosorbide dinitrate (ISORDIL) 30 MG tablet, Take  by mouth Daily., Disp: , Rfl:   •  nebivolol (BYSTOLIC) 10 MG tablet, Take 10 mg by mouth Daily., Disp: , Rfl:   •  nitroglycerin (NITROSTAT) 0.4 MG SL tablet, Place 1 tablet under the tongue Every 5 (Five) Minutes As Needed for Chest Pain., Disp: 20 tablet, Rfl: 3  •  Omega-3 Fatty Acids (FISH  "OIL) 1000 MG capsule capsule, Take  by mouth daily., Disp: , Rfl:   •  pantoprazole (PROTONIX) 40 MG EC tablet, Take  by mouth daily., Disp: , Rfl:   •  tiotropium bromide monohydrate (Spiriva Respimat) 2.5 MCG/ACT aerosol solution inhaler, Inhale 2 puffs Daily., Disp: , Rfl:       History of Present Illness   Daina Flowers is a 71 y.o. year old female here for follow up.  Recurrent severe chest pain radiating to her left arm even at rest.  This is been happening over the last 2 months.  She has taken nitroglycerin on every other day basis.  She has had family issues caring for sick family member and did not seek evaluation due to such.  She states this is similar to her anginal equivalent but much worsened than what she recalls it being.  She has been compliant with her medication but states that her blood pressure has been elevated presumably secondary to angina    OBJECTIVE:  Vitals:    03/04/22 1128   BP: 156/68   BP Location: Left arm   Patient Position: Sitting   Pulse: 56   SpO2: 97%   Weight: 97.4 kg (214 lb 12.8 oz)   Height: 154.9 cm (61\")     Physical Exam  Constitutional:       Appearance: She is well-developed.   Neck:      Thyroid: No thyromegaly.      Vascular: No carotid bruit, hepatojugular reflux or JVD.      Trachea: No tracheal deviation.   Cardiovascular:      Rate and Rhythm: Normal rate and regular rhythm.      Chest Wall: PMI is not displaced.      Pulses: Normal pulses and intact distal pulses. No midsystolic click and no opening snap.           Radial pulses are 2+ on the right side and 2+ on the left side.        Dorsalis pedis pulses are 2+ on the right side and 2+ on the left side.        Posterior tibial pulses are 2+ on the right side and 2+ on the left side.      Heart sounds: S1 normal and S2 normal. Heart sounds not distant. No murmur heard.  No friction rub. No gallop.    Pulmonary:      Effort: Pulmonary effort is normal.      Breath sounds: Normal breath sounds. No wheezing " or rales.   Abdominal:      General: Bowel sounds are normal.      Palpations: Abdomen is soft. There is no mass.      Tenderness: There is no abdominal tenderness. There is no guarding.   Musculoskeletal:      Cervical back: Normal range of motion and neck supple.         Diagnostic Data:    ECG 12 Lead    Date/Time: 3/4/2022 12:03 PM  Performed by: Octavio Barber MD  Authorized by: Octavio Barber MD   Previous ECG: no previous ECG available  Rhythm: sinus rhythm  BPM: 56  Conduction: incomplete right bundle branch block    Clinical impression: non-specific ECG              ASSESSMENT:   Diagnosis Plan   1. Unstable angina (HCC)  Case Request Cath Lab: Left Heart Cath   2. Primary hypertension  Case Request Cath Lab: Left Heart Cath   3. Mixed hyperlipidemia  Case Request Cath Lab: Left Heart Cath   4. Coronary artery disease involving native coronary artery of native heart with unstable angina pectoris (HCC)  Case Request Cath Lab: Left Heart Cath   5. Obesity (BMI 30-39.9)  Case Request Cath Lab: Left Heart Cath       PLAN:  Unstable angina post remote revascularization recurrent anginal equivalent unstable fashion risk benefits left heart catheterization cath-based intervention discussed patient agreeable to proceed    PVD status post remote PCI  Continued attempts walking regimen    Hypertension controlled metoprolol    Dyslipidemia on statin therapy    Counseled need for weight reduction dietary restriction        Lupe Tadeo, APRN, thank you for referring Ms. Flowers for evaluation.  I have forwarded my electronically generated recommendations to you for review.  Please do not hesitate to call with any questions.      Octavio Barber MD, FACC

## 2022-03-04 NOTE — INTERVAL H&P NOTE
H&P reviewed. The patient was examined and there are no changes to the H&P.    SONAL Barnes

## 2022-03-04 NOTE — H&P (VIEW-ONLY)
[facility] at [facility]  Office Progress Note  Daina ESPINOZA Kristie  1950  [address]  [facility] [place] [address]       Visit Date: 03/04/22    PCP: Lupe Tadeo, APRN  [address] [facility] [address]  [facility] [address]    IDENTIFICATION: A 71 y.o. female resident of [place], [place].    PROBLEM LIST:   1. Coronary artery disease:  a. Multiple interventions with greater than 60-mm stent to RCA, 2000 to 2006.   b. January 2007, CABG x3 with LIMA to LAD, SVG to PDA, and SVG to posterolateral of the RCA, Dr. Siddiqi.  c. April 2014, [facility]: Patent SVG to PDA. Occluded SVG to PL(L-R collats) and atretic LIMA. Unchanged from 2011, normal LVEF. FFR- LAD, 60% CX-med Rx  d. 5/17 lexiscan wnl EF 62%  e. 6/21 MPS wnl EF 70%  2. Peripheral vascular disease:  a. In 1997, infrarenal abdominal aortic stent per Dr. Sapin.  b. In 2006, carotid duplex, nonobstructive disease, minimal plaque noted.  c. April 2008, 5.0 x 27 bifurcational Express stents to iliac, ostium per Dr. العلي.  d. April 2008, AA with runoffs with patent infrarenal aortic stent with no evidence of compromise.  e. Discrepant upper extremity blood pressures with a 20-mm gradient left subclavian stenosis, status post subclavian angiography revealing proximal left subclavian stenosis not felt in need of revascularization at current, January 2011.  f. September 2012, mechanical thrombectomy with stent x2 to the right common iliac artery, stent to the left common iliac artery, bifurcation stent placement to the left external iliac and left common iliac arteries, Dr. Earl.  g. 7/2017 stent to LLE per [facility]   h. 10/20 LE LINDY wnl  3. Bilateral knee pain, followed in [facility] per orthopedics:  a. Left total knee replacement surgery per Dr. Fierro, September 2015.  4. Hyperlipidemia.  5. Obstructive sleep apnea on CPAP.  6. Nicotine addiction with smoking cessation, 2011.  7. Dyslipidemia.  8. Gastroesophageal reflux  disease.  9. Depression, , 2006.   10. Surgical history:  a. Appendectomy.   b. Cholecystectomy.   c. infrarenal abdominal stent, Dr. Spain, 1997.    Chief Complaint   Patient presents with   • Coronary artery disease involving native coronary artery of        Allergies  Allergies   Allergen Reactions   • Bactrim [Sulfamethoxazole-Trimethoprim] Itching   • Crestor [Rosuvastatin Calcium]    • Keflex [Cephalexin] Itching   • Lipitor [Atorvastatin] Other (See Comments)     high-dose with thrush.     • Lisinopril    • Lortab [Hydrocodone-Acetaminophen]    • Percocet [Oxycodone-Acetaminophen]    • Pravachol [Pravastatin Sodium]    • Zocor [Simvastatin] Myalgia     Muscle ache   • Diclofenac Sodium Rash   • Diphenhydramine Rash   • Estradiol Rash     Vaginal cream caused itching and rash   • Prednisone Rash   • Tizanidine Hcl Rash       Current Medications    Current Outpatient Medications:   •  aspirin 325 MG tablet, Take 81 mg by mouth Daily., Disp: , Rfl:   •  atorvastatin (LIPITOR) 40 MG tablet, Take 80 mg by mouth., Disp: , Rfl:   •  clopidogrel (PLAVIX) 75 MG tablet, Take  by mouth daily., Disp: , Rfl:   •  coenzyme Q10 100 MG capsule, Take 100 mg by mouth Daily., Disp: , Rfl:   •  ferrous sulfate 324 (65 Fe) MG tablet delayed-release EC tablet, Take 324 mg by mouth Daily With Breakfast., Disp: , Rfl:   •  FLUoxetine (PROzac) 20 MG capsule, Take 20 mg by mouth Daily., Disp: , Rfl:   •  folic acid (FOLVITE) 800 MCG tablet, Take 400 mg by mouth daily., Disp: , Rfl:   •  furosemide (LASIX) 40 MG tablet, Take 40 mg by mouth 2 (Two) Times a Day As Needed., Disp: , Rfl:   •  isosorbide dinitrate (ISORDIL) 30 MG tablet, Take  by mouth Daily., Disp: , Rfl:   •  nebivolol (BYSTOLIC) 10 MG tablet, Take 10 mg by mouth Daily., Disp: , Rfl:   •  nitroglycerin (NITROSTAT) 0.4 MG SL tablet, Place 1 tablet under the tongue Every 5 (Five) Minutes As Needed for Chest Pain., Disp: 20 tablet, Rfl: 3  •  Omega-3 Fatty Acids (FISH  "OIL) 1000 MG capsule capsule, Take  by mouth daily., Disp: , Rfl:   •  pantoprazole (PROTONIX) 40 MG EC tablet, Take  by mouth daily., Disp: , Rfl:   •  tiotropium bromide monohydrate (Spiriva Respimat) 2.5 MCG/ACT aerosol solution inhaler, Inhale 2 puffs Daily., Disp: , Rfl:       History of Present Illness   Daina Flowers is a 71 y.o. year old female here for follow up.  Recurrent severe chest pain radiating to her left arm even at rest.  This is been happening over the last 2 months.  She has taken nitroglycerin on every other day basis.  She has had family issues caring for sick family member and did not seek evaluation due to such.  She states this is similar to her anginal equivalent but much worsened than what she recalls it being.  She has been compliant with her medication but states that her blood pressure has been elevated presumably secondary to angina    OBJECTIVE:  Vitals:    03/04/22 1128   BP: 156/68   BP Location: Left arm   Patient Position: Sitting   Pulse: 56   SpO2: 97%   Weight: 97.4 kg (214 lb 12.8 oz)   Height: 154.9 cm (61\")     Physical Exam  Constitutional:       Appearance: She is well-developed.   Neck:      Thyroid: No thyromegaly.      Vascular: No carotid bruit, hepatojugular reflux or JVD.      Trachea: No tracheal deviation.   Cardiovascular:      Rate and Rhythm: Normal rate and regular rhythm.      Chest Wall: PMI is not displaced.      Pulses: Normal pulses and intact distal pulses. No midsystolic click and no opening snap.           Radial pulses are 2+ on the right side and 2+ on the left side.        Dorsalis pedis pulses are 2+ on the right side and 2+ on the left side.        Posterior tibial pulses are 2+ on the right side and 2+ on the left side.      Heart sounds: S1 normal and S2 normal. Heart sounds not distant. No murmur heard.  No friction rub. No gallop.    Pulmonary:      Effort: Pulmonary effort is normal.      Breath sounds: Normal breath sounds. No wheezing " or rales.   Abdominal:      General: Bowel sounds are normal.      Palpations: Abdomen is soft. There is no mass.      Tenderness: There is no abdominal tenderness. There is no guarding.   Musculoskeletal:      Cervical back: Normal range of motion and neck supple.         Diagnostic Data:    ECG 12 Lead    Date/Time: 3/4/2022 12:03 PM  Performed by: Octavio Barber MD  Authorized by: Octavio Barber MD   Previous ECG: no previous ECG available  Rhythm: sinus rhythm  BPM: 56  Conduction: incomplete right bundle branch block    Clinical impression: non-specific ECG              ASSESSMENT:   Diagnosis Plan   1. Unstable angina (HCC)  Case Request Cath Lab: Left Heart Cath   2. Primary hypertension  Case Request Cath Lab: Left Heart Cath   3. Mixed hyperlipidemia  Case Request Cath Lab: Left Heart Cath   4. Coronary artery disease involving native coronary artery of native heart with unstable angina pectoris (HCC)  Case Request Cath Lab: Left Heart Cath   5. Obesity (BMI 30-39.9)  Case Request Cath Lab: Left Heart Cath       PLAN:  Unstable angina post remote revascularization recurrent anginal equivalent unstable fashion risk benefits left heart catheterization cath-based intervention discussed patient agreeable to proceed    PVD status post remote PCI  Continued attempts walking regimen    Hypertension controlled metoprolol    Dyslipidemia on statin therapy    Counseled need for weight reduction dietary restriction        Lupe Tadeo, APRN, thank you for referring Ms. Flowers for evaluation.  I have forwarded my electronically generated recommendations to you for review.  Please do not hesitate to call with any questions.      Octavio Barber MD, FACC

## 2022-03-07 ENCOUNTER — DOCUMENTATION (OUTPATIENT)
Dept: CARDIAC REHAB | Facility: HOSPITAL | Age: 72
End: 2022-03-07

## 2022-03-07 ENCOUNTER — CALL CENTER PROGRAMS (OUTPATIENT)
Dept: CALL CENTER | Facility: HOSPITAL | Age: 72
End: 2022-03-07

## 2022-03-07 NOTE — PROGRESS NOTES
Referral received for Phase II Cardiac Rehab.  Staff reviewed chart and patient has qualifying diagnosis for Phase II Cardiac Rehab.  Staff will contact patient in regards to scheduling or referring to another facility.    9

## 2022-03-07 NOTE — OUTREACH NOTE
PCI/Device Survey    Flowsheet Row Responses   Facility patient discharged from? [place]   Procedure date 03/04/22   Procedure (if device, specify in description) PCI   PCI site Right, Groin, Left, Arm   Performing MD Dr. Dashawn Bustillo   Attempt successful? No   Unsuccessful attempts Attempt 1          CRISTOBAL AMATO - Registered Nurse

## 2022-03-07 NOTE — OUTREACH NOTE
PCI/Device Survey    Flowsheet Row Responses   Facility patient discharged from? [place]   Procedure date 03/04/22   Procedure (if device, specify in description) PCI   PCI site Right, Groin, Left, Arm   Performing MD Dr. Dashawn Bustillo   Attempt successful? Yes   Call start time [id number]   Call end time 1803   Symptom comments She is very sore in her chest.   Is the patient taking prescribed medications: ASA, Plavix   Nursing intervention Reminded to continue to take prescribed medications, Nurse provided patient education   Does the patient have any of the following symptoms related to the cath/surgical site? Bruising, Unusal pain at the site, Redness, warmth, or swelling at the site, Drainage (other than a small amount of blood on the dressing), Bleeding that does not stop after 30 minutes of holding steady pressure on the site, Arm, hand or leg pale, cool, tingly or numb, Fever   Nursing intervention Patient education provided   Does the patient have an appointment scheduled with the cardiologist? Yes   Appointment comments Appt on the 23rd. Cards appt in April   If the patient is a current smoker, are they able to teach back resources for cessation? Not a smoker   Did the patient feel prepared to go home on the same day as the procedure? Yes   Is the patient satisfied with the same day discharge process? Yes   Discharge process comments No problem at all.   PCI/Device call completed Yes   Wrap up additional comments She is doing well except for the soreness she says.          CRISTOBAL AMATO - Registered Nurse

## 2022-03-08 ENCOUNTER — DOCUMENTATION (OUTPATIENT)
Dept: CARDIAC REHAB | Facility: HOSPITAL | Age: 72
End: 2022-03-08

## 2022-03-08 NOTE — PROGRESS NOTES
Cardiac Rehab staff mailed referral letter to patient regarding Phase II Cardiac Rehab program. Instruction for patient to contact [facility] Cardiac Rehab Department for additional program information and to forward referral to closest facility.

## 2022-04-12 NOTE — PROGRESS NOTES
[facility] Cardiology  Office Progress Note  Daina ESPINOZA Kristie  1950  [address]  [facility] [place] [address]       Visit Date: 04/13/22    PCP: Lupe Tadeo, APRN  [address] [facility] [address]  [place] [address]    IDENTIFICATION: A 71 y.o. female  resident of [place], [place].    PROBLEM LIST:   1. Coronary artery disease:  a. Multiple interventions with greater than 60-mm stent to RCA, 2000 to 2006.   b. January 2007, CABG x3 with LIMA to LAD, SVG to PDA, and SVG to posterolateral of the RCA, Dr. Siddiqi.  c. April 2014, LHC: Patent SVG to PDA. Occluded SVG to PL(L-R collats) and atretic LIMA. Unchanged from 2011, normal LVEF. FFR- LAD, 60% CX-med Rx  d. 5/17 lexiscan wnl EF 62%  e. 6/21 MPS wnl EF 70%  f. 3/22 LHC: 70% proximal LAD stenosis with abnormal RFR.  Treated with 3.25 x 15 Xience TALIB.  Normal LVEF.  Occluded RCA with widely patent SVG.  Patent left circumflex vessels.  Left-to-right collaterals to right PL and right RV marginal branches.  2. Peripheral vascular disease:  a. In 1997, infrarenal abdominal aortic stent per Dr. Spain.  b. In 2006, carotid duplex, nonobstructive disease, minimal plaque noted.  c. April 2008, 5.0 x 27 bifurcational Express stents to iliac, ostium per Dr. العلي.  d. April 2008, AA with runoffs with patent infrarenal aortic stent with no evidence of compromise.  e. Discrepant upper extremity blood pressures with a 20-mm gradient left subclavian stenosis, status post subclavian angiography revealing proximal left subclavian stenosis not felt in need of revascularization at current, January 2011.  f. September 2012, mechanical thrombectomy with stent x2 to the right common iliac artery, stent to the left common iliac artery, bifurcation stent placement to the left external iliac and left common iliac arteries, Dr. Earl.  g. 7/2017 stent to LLE per [facility]   h. 10/20 LE LINDY wnl  3. Bilateral knee pain, followed in [facility] per  orthopedics:  a. Left total knee replacement surgery per Dr. Fierro, September 2015.  4. Hyperlipidemia.  1. 3/22 166/118/54/91  5. Obstructive sleep apnea on CPAP.  6. Nicotine addiction with smoking cessation, 2011.  7. Dyslipidemia.  8. Gastroesophageal reflux disease.  9. Depression, , 2006.   10. Surgical history:  a. Appendectomy.   b. Cholecystectomy.   c. infrarenal abdominal stent, Dr. Spain, 1997.      CC:   Chief Complaint   Patient presents with   • Unstable angina (HCC)       Allergies  Allergies   Allergen Reactions   • Bactrim [Sulfamethoxazole-Trimethoprim] Itching   • Crestor [Rosuvastatin Calcium]    • Keflex [Cephalexin] Itching   • Lipitor [Atorvastatin] Other (See Comments)     high-dose with thrush.     • Lisinopril    • Lortab [Hydrocodone-Acetaminophen]    • Percocet [Oxycodone-Acetaminophen]    • Pravachol [Pravastatin Sodium]    • Zocor [Simvastatin] Myalgia     Muscle ache   • Diclofenac Sodium Rash   • Diphenhydramine Rash   • Estradiol Rash     Vaginal cream caused itching and rash   • Prednisone Rash   • Tizanidine Hcl Rash       Current Medications    Current Outpatient Medications:   •  aspirin 325 MG tablet, Take 325 mg by mouth Daily., Disp: , Rfl:   •  atorvastatin (Lipitor) 80 MG tablet, Take 1 tablet by mouth Every Night., Disp: 30 tablet, Rfl: 11  •  clopidogrel (PLAVIX) 75 MG tablet, Take  by mouth daily., Disp: , Rfl:   •  Coenzyme Q10 200 MG capsule, Take 100 mg by mouth Daily., Disp: , Rfl:   •  ferrous sulfate 324 (65 Fe) MG tablet delayed-release EC tablet, Take 324 mg by mouth Daily With Breakfast., Disp: , Rfl:   •  FLUoxetine (PROzac) 20 MG capsule, Take 20 mg by mouth Daily., Disp: , Rfl:   •  folic acid (FOLVITE) 800 MCG tablet, Take 400 mg by mouth daily., Disp: , Rfl:   •  furosemide (LASIX) 40 MG tablet, Take 40 mg by mouth Daily As Needed., Disp: , Rfl:   •  isosorbide dinitrate (ISORDIL) 30 MG tablet, Take  by mouth Daily., Disp: , Rfl:   •  nebivolol  "(BYSTOLIC) 10 MG tablet, Take 10 mg by mouth Daily., Disp: , Rfl:   •  nitroglycerin (NITROSTAT) 0.4 MG SL tablet, Place 1 tablet under the tongue Every 5 (Five) Minutes As Needed for Chest Pain., Disp: 20 tablet, Rfl: 3  •  Omega-3 Fatty Acids (FISH OIL) 1000 MG capsule capsule, Take  by mouth daily., Disp: , Rfl:   •  pantoprazole (PROTONIX) 40 MG EC tablet, Take  by mouth daily., Disp: , Rfl:   •  tiotropium bromide monohydrate (Spiriva Respimat) 2.5 MCG/ACT aerosol solution inhaler, Inhale 2 puffs Daily As Needed., Disp: , Rfl:       History of Present Illness   Daina Flowers is a 71 y.o. year old female here for follow up.    Pt denies any chest pain, dyspnea, dyspnea on exertion, orthopnea, PND, palpitations, lower extremity edema, or claudication.  Some improvement in dyspnea and chest discomfort post most recent PCI and stenting.  Has some labile blood pressures typically low at home.  She is not able to exercise interested in cardiac rehab      OBJECTIVE:  Vitals:    04/13/22 1116   BP: 158/66   BP Location: Right arm   Patient Position: Sitting   Pulse: 63   SpO2: 96%   Weight: 96.6 kg (213 lb)   Height: 154.9 cm (61\")     Body mass index is 40.25 kg/m².    Constitutional:       Appearance: Healthy appearance. Not in distress.   Neck:      Vascular: No JVR. JVD normal.   Pulmonary:      Effort: Pulmonary effort is normal.      Breath sounds: Normal breath sounds. No wheezing. No rhonchi. No rales.   Chest:      Chest wall: Not tender to palpatation.   Cardiovascular:      PMI at left midclavicular line. Normal rate. Regular rhythm. Normal S1. Normal S2.      Murmurs: There is no murmur.      No gallop. No click. No rub.   Pulses:     Decreased pulses.   Edema:     Thigh: bilateral pitting edema of the thigh.     Pretibial: bilateral pitting edema of the pretibial area.     Ankle: bilateral pitting edema of the ankle.     Feet: bilateral pitting edema of the feet.  Abdominal:      General: Bowel sounds " are normal.      Palpations: Abdomen is soft.      Tenderness: There is no abdominal tenderness.   Musculoskeletal: Normal range of motion.         General: No tenderness. Skin:     General: Skin is warm and dry.   Neurological:      General: No focal deficit present.      Mental Status: Alert and oriented to person, place and time.         Diagnostic Data:  Procedures      ASSESSMENT:   Diagnosis Plan   1. Coronary artery disease involving native coronary artery of native heart without angina pectoris     2. PVD (peripheral vascular disease) with claudication ([facility])     3. Primary hypertension     4. Mixed hyperlipidemia         PLAN:  CAD post surgical catheter-based intervention fully revascularized preserved LV function continued attempts at medical manage    Hypertension patient is followed cardiac rehab and at home.  Continue nebivolol isosorbide ARB indicated if systolic pressure remains above 130    Mixed dyslipidemia controlled on statin therapy    Obesity recommended carbohydrate restriction intermittent fasting    PVD stable chronic claudication no limb threatening issues.          Octavio Barber MD, [facility]

## 2022-04-13 ENCOUNTER — OFFICE VISIT (OUTPATIENT)
Dept: CARDIOLOGY | Facility: CLINIC | Age: 72
End: 2022-04-13

## 2022-04-13 VITALS
SYSTOLIC BLOOD PRESSURE: 158 MMHG | BODY MASS INDEX: 40.22 KG/M2 | HEIGHT: 61 IN | DIASTOLIC BLOOD PRESSURE: 66 MMHG | WEIGHT: 213 LBS | OXYGEN SATURATION: 96 % | HEART RATE: 63 BPM

## 2022-04-13 DIAGNOSIS — E78.2 MIXED HYPERLIPIDEMIA: ICD-10-CM

## 2022-04-13 DIAGNOSIS — I10 PRIMARY HYPERTENSION: ICD-10-CM

## 2022-04-13 DIAGNOSIS — I73.9 PVD (PERIPHERAL VASCULAR DISEASE) WITH CLAUDICATION: ICD-10-CM

## 2022-04-13 DIAGNOSIS — I25.10 CORONARY ARTERY DISEASE INVOLVING NATIVE CORONARY ARTERY OF NATIVE HEART WITHOUT ANGINA PECTORIS: Primary | ICD-10-CM

## 2022-04-13 PROCEDURE — 99214 OFFICE O/P EST MOD 30 MIN: CPT | Performed by: INTERNAL MEDICINE

## 2022-04-27 ENCOUNTER — DOCUMENTATION (OUTPATIENT)
Dept: CARDIAC REHAB | Facility: HOSPITAL | Age: 72
End: 2022-04-27

## 2022-04-27 NOTE — PROGRESS NOTES
Pt. Referred for Phase II Cardiac Rehab. Staff discussed benefits of exercise, program protocol, and educational material provided. Teach back verified.  Permission granted from patient for staff to fax referral information to outlying program at this time.  Staff faxed referral info to [facility].

## 2022-05-09 ENCOUNTER — DOCUMENTATION (OUTPATIENT)
Dept: CARDIAC REHAB | Facility: HOSPITAL | Age: 72
End: 2022-05-09

## 2022-05-09 NOTE — PROGRESS NOTES
Pt. Referred for Phase II Cardiac Rehab. Staff discussed benefits of exercise, program protocol, and educational material provided. Teach back verified.  Permission granted from patient for staff to fax referral information to outlying program at this time.  Staff faxed referral info to [facility].

## 2022-08-20 ENCOUNTER — APPOINTMENT (OUTPATIENT)
Dept: GENERAL RADIOLOGY | Facility: HOSPITAL | Age: 72
End: 2022-08-20

## 2022-08-20 ENCOUNTER — HOSPITAL ENCOUNTER (EMERGENCY)
Facility: HOSPITAL | Age: 72
Discharge: HOME | End: 2022-08-20
Attending: EMERGENCY MEDICINE | Admitting: EMERGENCY MEDICINE

## 2022-08-20 VITALS
BODY MASS INDEX: 40.59 KG/M2 | RESPIRATION RATE: 17 BRPM | HEART RATE: 56 BPM | SYSTOLIC BLOOD PRESSURE: 127 MMHG | TEMPERATURE: 98.3 F | WEIGHT: 215 LBS | HEIGHT: 61 IN | DIASTOLIC BLOOD PRESSURE: 63 MMHG | OXYGEN SATURATION: 97 %

## 2022-08-20 DIAGNOSIS — R07.9 CHEST PAIN, UNSPECIFIED TYPE: Primary | ICD-10-CM

## 2022-08-20 DIAGNOSIS — Z86.79 HISTORY OF CORONARY ARTERY DISEASE: ICD-10-CM

## 2022-08-20 LAB
ALBUMIN SERPL-MCNC: 3.7 G/DL (ref 3.5–5.2)
ALBUMIN/GLOB SERPL: 1.1 G/DL
ALP SERPL-CCNC: 105 U/L (ref 39–117)
ALT SERPL W P-5'-P-CCNC: 11 U/L (ref 1–33)
ANION GAP SERPL CALCULATED.3IONS-SCNC: 10 MMOL/L (ref 5–15)
AST SERPL-CCNC: 14 U/L (ref 1–32)
BASOPHILS # BLD AUTO: 0.03 10*3/MM3 (ref 0–0.2)
BASOPHILS NFR BLD AUTO: 0.6 % (ref 0–1.5)
BILIRUB SERPL-MCNC: 0.6 MG/DL (ref 0–1.2)
BUN SERPL-MCNC: 12 MG/DL (ref 8–23)
BUN/CREAT SERPL: 13.5 (ref 7–25)
CALCIUM SPEC-SCNC: 8.7 MG/DL (ref 8.6–10.5)
CHLORIDE SERPL-SCNC: 98 MMOL/L (ref 98–107)
CO2 SERPL-SCNC: 24 MMOL/L (ref 22–29)
CREAT SERPL-MCNC: 0.89 MG/DL (ref 0.57–1)
DEPRECATED RDW RBC AUTO: 49.2 FL (ref 37–54)
EGFRCR SERPLBLD CKD-EPI 2021: 68.6 ML/MIN/1.73
EOSINOPHIL # BLD AUTO: 0.17 10*3/MM3 (ref 0–0.4)
EOSINOPHIL NFR BLD AUTO: 3.5 % (ref 0.3–6.2)
ERYTHROCYTE [DISTWIDTH] IN BLOOD BY AUTOMATED COUNT: 14.7 % (ref 12.3–15.4)
GLOBULIN UR ELPH-MCNC: 3.3 GM/DL
GLUCOSE SERPL-MCNC: 122 MG/DL (ref 65–99)
HCT VFR BLD AUTO: 37.4 % (ref 34–46.6)
HGB BLD-MCNC: 12.5 G/DL (ref 12–15.9)
HOLD SPECIMEN: NORMAL
IMM GRANULOCYTES # BLD AUTO: 0.02 10*3/MM3 (ref 0–0.05)
IMM GRANULOCYTES NFR BLD AUTO: 0.4 % (ref 0–0.5)
LIPASE SERPL-CCNC: 23 U/L (ref 13–60)
LYMPHOCYTES # BLD AUTO: 1.33 10*3/MM3 (ref 0.7–3.1)
LYMPHOCYTES NFR BLD AUTO: 27.4 % (ref 19.6–45.3)
MCH RBC QN AUTO: 30.5 PG (ref 26.6–33)
MCHC RBC AUTO-ENTMCNC: 33.4 G/DL (ref 31.5–35.7)
MCV RBC AUTO: 91.2 FL (ref 79–97)
MONOCYTES # BLD AUTO: 0.54 10*3/MM3 (ref 0.1–0.9)
MONOCYTES NFR BLD AUTO: 11.1 % (ref 5–12)
NEUTROPHILS NFR BLD AUTO: 2.76 10*3/MM3 (ref 1.7–7)
NEUTROPHILS NFR BLD AUTO: 57 % (ref 42.7–76)
NRBC BLD AUTO-RTO: 0 /100 WBC (ref 0–0.2)
NT-PROBNP SERPL-MCNC: 350.8 PG/ML (ref 0–900)
PLATELET # BLD AUTO: 155 10*3/MM3 (ref 140–450)
PMV BLD AUTO: 10 FL (ref 6–12)
POTASSIUM SERPL-SCNC: 3.9 MMOL/L (ref 3.5–5.2)
PROT SERPL-MCNC: 7 G/DL (ref 6–8.5)
QT INTERVAL: 430 MS
QTC INTERVAL: 425 MS
RBC # BLD AUTO: 4.1 10*6/MM3 (ref 3.77–5.28)
SODIUM SERPL-SCNC: 132 MMOL/L (ref 136–145)
TROPONIN T SERPL-MCNC: <0.01 NG/ML (ref 0–0.03)
TROPONIN T SERPL-MCNC: <0.01 NG/ML (ref 0–0.03)
WBC NRBC COR # BLD: 4.85 10*3/MM3 (ref 3.4–10.8)
WHOLE BLOOD HOLD COAG: NORMAL
WHOLE BLOOD HOLD SPECIMEN: NORMAL

## 2022-08-20 PROCEDURE — 84484 ASSAY OF TROPONIN QUANT: CPT | Performed by: EMERGENCY MEDICINE

## 2022-08-20 PROCEDURE — 85025 COMPLETE CBC W/AUTO DIFF WBC: CPT | Performed by: EMERGENCY MEDICINE

## 2022-08-20 PROCEDURE — 36415 COLL VENOUS BLD VENIPUNCTURE: CPT

## 2022-08-20 PROCEDURE — 71045 X-RAY EXAM CHEST 1 VIEW: CPT

## 2022-08-20 PROCEDURE — 83690 ASSAY OF LIPASE: CPT | Performed by: EMERGENCY MEDICINE

## 2022-08-20 PROCEDURE — 93005 ELECTROCARDIOGRAM TRACING: CPT | Performed by: EMERGENCY MEDICINE

## 2022-08-20 PROCEDURE — 99284 EMERGENCY DEPT VISIT MOD MDM: CPT

## 2022-08-20 PROCEDURE — 80053 COMPREHEN METABOLIC PANEL: CPT | Performed by: EMERGENCY MEDICINE

## 2022-08-20 PROCEDURE — 83880 ASSAY OF NATRIURETIC PEPTIDE: CPT | Performed by: EMERGENCY MEDICINE

## 2022-08-20 RX ORDER — ISOSORBIDE MONONITRATE 30 MG/1
30 TABLET, EXTENDED RELEASE ORAL ONCE
Status: COMPLETED | OUTPATIENT
Start: 2022-08-20 | End: 2022-08-20

## 2022-08-20 RX ORDER — NITROGLYCERIN 0.4 MG/1
0.4 TABLET SUBLINGUAL
Status: DISCONTINUED | OUTPATIENT
Start: 2022-08-20 | End: 2022-08-20 | Stop reason: HOSPADM

## 2022-08-20 RX ORDER — ASPIRIN 81 MG/1
324 TABLET, CHEWABLE ORAL ONCE
Status: COMPLETED | OUTPATIENT
Start: 2022-08-20 | End: 2022-08-20

## 2022-08-20 RX ORDER — SODIUM CHLORIDE 0.9 % (FLUSH) 0.9 %
10 SYRINGE (ML) INJECTION AS NEEDED
Status: DISCONTINUED | OUTPATIENT
Start: 2022-08-20 | End: 2022-08-20 | Stop reason: HOSPADM

## 2022-08-20 RX ADMIN — ASPIRIN 81 MG CHEWABLE TABLET 324 MG: 81 TABLET CHEWABLE at 10:33

## 2022-08-20 RX ADMIN — ISOSORBIDE MONONITRATE 30 MG: 30 TABLET, EXTENDED RELEASE ORAL at 12:10

## 2022-08-20 RX ADMIN — NITROGLYCERIN 0.4 MG: 0.4 TABLET SUBLINGUAL at 10:33

## 2022-08-20 NOTE — ED PROVIDER NOTES
Subjective   Pt is a 71 yo female presenting to ED with complaints of chest pain. PMHx significant for CAD (CABG), HTN, HLD, GENE, PVD, Obesity and Arthritis. Pt reports fatigue and some SOB for the past week but then woke up from sleep around 0600 with chest pain and left arm pain. She describes a heaviness in left chest and sharp pain in left shoulder. No change in pain with movement. She took x1 NTG at home without relief. She explains with her prior cardiac problems she typically has left shoulder pain and not significant chest pain. Her last heart cath was 3/2022 and she had stent placed to LAD. Followed by Dr. Barber. She reports compliance with meds. She has not take ASA today. She has had some nausea but denies vomiting or diarrhea. She denies syncope, cough, fever or abdominal pain.       History provided by:  Patient and medical records      Review of Systems   Constitutional: Positive for fatigue. Negative for chills and fever.   HENT: Negative for congestion, sore throat and trouble swallowing.    Eyes: Negative for visual disturbance.   Respiratory: Positive for shortness of breath. Negative for cough.    Cardiovascular: Positive for chest pain. Negative for leg swelling.   Gastrointestinal: Negative for abdominal pain, diarrhea, nausea and vomiting.   Genitourinary: Negative for difficulty urinating, dysuria, flank pain and hematuria.   Musculoskeletal: Positive for arthralgias. Negative for back pain.   Skin: Negative for rash and wound.   Neurological: Negative for dizziness, syncope, speech difficulty, weakness, numbness and headaches.   Psychiatric/Behavioral: Negative for confusion.   All other systems reviewed and are negative.      Past Medical History:   Diagnosis Date   • Coronary artery disease    • Depression 2006    Depression, , 2006.    • Dyslipidemia    • GERD (gastroesophageal reflux disease)    • Hyperlipidemia    • Knee pain    • Nicotine addiction     Nicotine addiction with  smoking cessation, 2011.   • Obstructive sleep apnea on CPAP    • Peripheral vascular disease (HCC)    • Primary hypertension 3/4/2022       Allergies   Allergen Reactions   • Bactrim [Sulfamethoxazole-Trimethoprim] Itching   • Crestor [Rosuvastatin Calcium]    • Keflex [Cephalexin] Itching   • Lipitor [Atorvastatin] Other (See Comments)     high-dose with thrush.     • Lisinopril    • Lortab [Hydrocodone-Acetaminophen]    • Percocet [Oxycodone-Acetaminophen]    • Pravachol [Pravastatin Sodium]    • Zocor [Simvastatin] Myalgia     Muscle ache   • Diclofenac Sodium Rash   • Diphenhydramine Rash   • Estradiol Rash     Vaginal cream caused itching and rash   • Prednisone Rash   • Tizanidine Hcl Rash       Past Surgical History:   Procedure Laterality Date   • ABDOMINAL AORTA STENT  1997    Infrarenal abdominal stent, Dr. Spain, 1997.   • APPENDECTOMY     • CARDIAC CATHETERIZATION     • CARDIAC CATHETERIZATION Left 3/4/2022    Procedure: Left Heart Cath;  Surgeon: Dashawn Bustillo MD;  Location:  [facility];  Service: Cardiovascular;  Laterality: Left;   • CARDIAC CATHETERIZATION N/A 3/4/2022    Procedure: Left Heart Cath;  Surgeon: Dashawn Bustillo MD;  Location:  [facility];  Service: Cardiovascular;  Laterality: N/A;   • CATARACT EXTRACTION, BILATERAL  2019   • CHOLECYSTECTOMY     • CORONARY ANGIOPLASTY WITH STENT PLACEMENT Right 2000    Multiple interventions with greater than 60-mm stent to RCA, 2000 to 2006.    • CORONARY ARTERY BYPASS GRAFT  01/2007 January 2007, CABG x3 with LIMA to LAD, SVG to PDA, and SVG to posterolateral of the RCA, Dr. Siddiqi.   • ILIAC ARTERY STENT  04/2008 April 2008, 5.0 x 27 bifurcational Express stents to iliac, ostium per Dr. العلي.   • ILIAC ARTERY STENT Left 09/2012 September 2012, mechanical thrombectomy with stent x2 to the right common iliac artery, stent to the left common iliac artery, bifurcation stent placement to the left external  iliac and left common iliac arteries, Dr. Earl.   • TOTAL KNEE ARTHROPLASTY Left 2015    Left total knee replacement surgery per Dr. Fierro, 2015.       Family History   Problem Relation Age of Onset   • Heart disease Mother    • Hypertension Mother    • Heart attack Mother    • Stroke Father        Social History     Socioeconomic History   • Marital status:    Tobacco Use   • Smoking status: Former Smoker     Quit date:      Years since quittin.6   • Smokeless tobacco: Never Used   Vaping Use   • Vaping Use: Never used   Substance and Sexual Activity   • Alcohol use: No   • Drug use: No   • Sexual activity: Defer           Objective   Physical Exam  Vitals and nursing note reviewed.   Constitutional:       Appearance: She is well-developed. She is obese.   HENT:      Head: Atraumatic.      Nose: Nose normal.   Eyes:      General: Lids are normal.      Conjunctiva/sclera: Conjunctivae normal.      Pupils: Pupils are equal, round, and reactive to light.   Cardiovascular:      Rate and Rhythm: Normal rate and regular rhythm.      Heart sounds: Normal heart sounds.   Pulmonary:      Effort: Pulmonary effort is normal.      Breath sounds: Normal breath sounds. No wheezing.   Abdominal:      General: There is no distension.      Palpations: Abdomen is soft.      Tenderness: There is no abdominal tenderness. There is no guarding or rebound.   Musculoskeletal:         General: No tenderness. Normal range of motion.      Left shoulder: No swelling or tenderness. Normal range of motion.      Cervical back: Normal range of motion and neck supple.   Skin:     General: Skin is warm and dry.      Findings: No erythema or rash.   Neurological:      Mental Status: She is alert and oriented to person, place, and time.      Sensory: No sensory deficit.   Psychiatric:         Speech: Speech normal.         Behavior: Behavior normal.         Procedures           ED Course  ED Course as of 22  1237   Sat Aug 20, 2022   1037 Troponin T: <0.010 [RT]   1038 proBNP: 350.8 [RT]   1217 Troponin T: <0.010 [RT]      ED Course User Index  [RT] Betty Norton PA      Discussed patient with Dr. Roper who is agreeable with ED course and tx plan.    Consulted Cardiologist Dr. Verdin. She recommended starting patient on Imdur 30mg if not already taking and patient can f/u with cardiac clinic.     Re-examined patient several times in ED. Pt resting and CP / left shoulder pain resolved after Nitro SL x1 in ED with no recurrence for several hours. She is already taking Imdur 30mg at home and typically in the evening. Pt given dose in ED and she not taking her home dose today and resume tomorrow. She will return to ED if new / worse sx.     Reviewed old records.       Recent Results (from the past 24 hour(s))   ECG 12 Lead    Collection Time: 08/20/22  9:30 AM   Result Value Ref Range    QT Interval 430 ms    QTC Interval 425 ms   Troponin    Collection Time: 08/20/22  9:52 AM    Specimen: Blood   Result Value Ref Range    Troponin T <0.010 0.000 - 0.030 ng/mL   Comprehensive Metabolic Panel    Collection Time: 08/20/22  9:52 AM    Specimen: Blood   Result Value Ref Range    Glucose 122 (H) 65 - 99 mg/dL    BUN 12 8 - 23 mg/dL    Creatinine 0.89 0.57 - 1.00 mg/dL    Sodium 132 (L) 136 - 145 mmol/L    Potassium 3.9 3.5 - 5.2 mmol/L    Chloride 98 98 - 107 mmol/L    CO2 24.0 22.0 - 29.0 mmol/L    Calcium 8.7 8.6 - 10.5 mg/dL    Total Protein 7.0 6.0 - 8.5 g/dL    Albumin 3.70 3.50 - 5.20 g/dL    ALT (SGPT) 11 1 - 33 U/L    AST (SGOT) 14 1 - 32 U/L    Alkaline Phosphatase 105 39 - 117 U/L    Total Bilirubin 0.6 0.0 - 1.2 mg/dL    Globulin 3.3 gm/dL    A/G Ratio 1.1 g/dL    BUN/Creatinine Ratio 13.5 7.0 - 25.0    Anion Gap 10.0 5.0 - 15.0 mmol/L    eGFR 68.6 >60.0 mL/min/1.73   Lipase    Collection Time: 08/20/22  9:52 AM    Specimen: Blood   Result Value Ref Range    Lipase 23 13 - 60 U/L   BNP    Collection Time:  08/20/22  9:52 AM    Specimen: Blood   Result Value Ref Range    proBNP 350.8 0.0 - 900.0 pg/mL   Green Top (Gel)    Collection Time: 08/20/22  9:52 AM   Result Value Ref Range    Extra Tube Hold for add-ons.    Lavender Top    Collection Time: 08/20/22  9:52 AM   Result Value Ref Range    Extra Tube hold for add-on    Gold Top - SST    Collection Time: 08/20/22  9:52 AM   Result Value Ref Range    Extra Tube Hold for add-ons.    Light Blue Top    Collection Time: 08/20/22  9:52 AM   Result Value Ref Range    Extra Tube Hold for add-ons.    CBC Auto Differential    Collection Time: 08/20/22  9:52 AM    Specimen: Blood   Result Value Ref Range    WBC 4.85 3.40 - 10.80 10*3/mm3    RBC 4.10 3.77 - 5.28 10*6/mm3    Hemoglobin 12.5 12.0 - 15.9 g/dL    Hematocrit 37.4 34.0 - 46.6 %    MCV 91.2 79.0 - 97.0 fL    MCH 30.5 26.6 - 33.0 pg    MCHC 33.4 31.5 - 35.7 g/dL    RDW 14.7 12.3 - 15.4 %    RDW-SD 49.2 37.0 - 54.0 fl    MPV 10.0 6.0 - 12.0 fL    Platelets 155 140 - 450 10*3/mm3    Neutrophil % 57.0 42.7 - 76.0 %    Lymphocyte % 27.4 19.6 - 45.3 %    Monocyte % 11.1 5.0 - 12.0 %    Eosinophil % 3.5 0.3 - 6.2 %    Basophil % 0.6 0.0 - 1.5 %    Immature Grans % 0.4 0.0 - 0.5 %    Neutrophils, Absolute 2.76 1.70 - 7.00 10*3/mm3    Lymphocytes, Absolute 1.33 0.70 - 3.10 10*3/mm3    Monocytes, Absolute 0.54 0.10 - 0.90 10*3/mm3    Eosinophils, Absolute 0.17 0.00 - 0.40 10*3/mm3    Basophils, Absolute 0.03 0.00 - 0.20 10*3/mm3    Immature Grans, Absolute 0.02 0.00 - 0.05 10*3/mm3    nRBC 0.0 0.0 - 0.2 /100 WBC   ECG 12 Lead    Collection Time: 08/20/22 11:36 AM   Result Value Ref Range    QT Interval 434 ms    QTC Interval 411 ms   Troponin    Collection Time: 08/20/22 11:40 AM    Specimen: Blood   Result Value Ref Range    Troponin T <0.010 0.000 - 0.030 ng/mL     Note: In addition to lab results from this visit, the labs listed above may include labs taken at another facility or during a different encounter within the last  24 hours. Please correlate lab times with ED admission and discharge times for further clarification of the services performed during this visit.    XR Chest 1 View   Final Result   No evidence of acute disease in the chest.        This report was finalized on 8/20/2022 10:08 AM by Shashi Huitron.            Vitals:    08/20/22 1115 08/20/22 1140 08/20/22 1141 08/20/22 1210   BP: 150/67 145/65  135/48   BP Location:       Patient Position:       Pulse: 68  59 60   Resp:       Temp:       TempSrc:       SpO2: 94%  98%    Weight:       Height:         Medications   sodium chloride 0.9 % flush 10 mL (has no administration in time range)   nitroglycerin (NITROSTAT) SL tablet 0.4 mg (0.4 mg Sublingual Given 8/20/22 1033)   aspirin chewable tablet 324 mg (324 mg Oral Given 8/20/22 1033)   isosorbide mononitrate (IMDUR) 24 hr tablet 30 mg (30 mg Oral Given 8/20/22 1210)     ECG/EMG Results (last 24 hours)     Procedure Component Value Units Date/Time    ECG 12 Lead [[id number]] Collected: 08/20/22 0930     Updated: 08/20/22 0930     QT Interval 430 ms      QTC Interval 425 ms     Narrative:      Test Reason : chest pain  Blood Pressure :   */*   mmHG  Vent. Rate :  59 BPM     Atrial Rate :  59 BPM     P-R Int : 120 ms          QRS Dur :  98 ms      QT Int : 430 ms       P-R-T Axes : -19  48  48 degrees     QTc Int : 425 ms    Sinus bradycardia  Incomplete right bundle branch block  Borderline ECG  When compared with ECG of 10-JUL-2018 12:16,  No significant change was found    Referred By: EDMD           Confirmed By:     ECG 12 Lead [[id number]] Collected: 08/20/22 1136     Updated: 08/20/22 1136     QT Interval 434 ms      QTC Interval 411 ms     Narrative:      Test Reason : chest pain  Blood Pressure :   */*   mmHG  Vent. Rate :  54 BPM     Atrial Rate :  54 BPM     P-R Int : 142 ms          QRS Dur :  96 ms      QT Int : 434 ms       P-R-T Axes :  34  48  43 degrees     QTc Int : 411 ms    Sinus  bradycardia  Incomplete right bundle branch block  Borderline ECG  When compared with ECG of 20-AUG-2022 09:30, (Unconfirmed)  No significant change was found    Referred By: EDMD           Confirmed By:         ECG 12 Lead   Preliminary Result   Test Reason : chest pain   Blood Pressure :   */*   mmHG   Vent. Rate :  54 BPM     Atrial Rate :  54 BPM      P-R Int : 142 ms          QRS Dur :  96 ms       QT Int : 434 ms       P-R-T Axes :  34  48  43 degrees      QTc Int : 411 ms      Sinus bradycardia   Incomplete right bundle branch block   Borderline ECG   When compared with ECG of 20-AUG-2022 09:30, (Unconfirmed)   No significant change was found      Referred By: EDMD           Confirmed By:       ECG 12 Lead   Preliminary Result   Test Reason : chest pain   Blood Pressure :   */*   mmHG   Vent. Rate :  59 BPM     Atrial Rate :  59 BPM      P-R Int : 120 ms          QRS Dur :  98 ms       QT Int : 430 ms       P-R-T Axes : -19  48  48 degrees      QTc Int : 425 ms      Sinus bradycardia   Incomplete right bundle branch block   Borderline ECG   When compared with ECG of 10-JUL-2018 12:16,   No significant change was found      Referred By: EDMD           Confirmed By:             DISCHARGE    Patient discharged in stable condition.    Reviewed implications of results, diagnosis, meds, responsibility to follow up, warning signs and symptoms of possible worsening, potential complications and reasons to return to ER.    Patient/Family voiced understanding of above instructions.    Discussed plan for discharge, as there is no emergent indication for admission.  Pt/family is agreeable and understands need for follow up and possible repeat testing.  Pt/family is aware that discharge does not mean that nothing is wrong but that it indicates no emergency is currently present that requires admission and they must continue care with follow-up as given below or with a physician of their choice.     FOLLOW-UP  E VI  Jadwin  [address] [place] [address] [address]  [facility] [id number] [phone]        Lupe Tadeo, APRN  [address]  [address]  [place] [address]  [phone]    Schedule an appointment as soon as possible for a visit       [facility] Emergency Department  [address] [facility] [id number] [phone]    If symptoms worsen         Medication List      No changes were made to your prescriptions during this visit.                                       MDM    Final diagnoses:   Chest pain, unspecified type   History of coronary artery disease       ED Disposition  ED Disposition     ED Disposition   Discharge    Condition   Stable    Comment   --             [facility]  [address] [place] [address] [address]  [facility] [id number] [phone]        Lupe Tadeo, APRN  [address]  [place] [id number]  [phone]    Schedule an appointment as soon as possible for a visit       [facility] Emergency Department  [address] [facility] [address] [phone]    If symptoms worsen         Medication List      No changes were made to your prescriptions during this visit.          Betty Norton PA  08/20/22 1233

## 2022-08-21 LAB
QT INTERVAL: 434 MS
QTC INTERVAL: 411 MS

## 2022-09-01 ENCOUNTER — OFFICE VISIT (OUTPATIENT)
Dept: CARDIOLOGY | Facility: CLINIC | Age: 72
End: 2022-09-01

## 2022-09-01 VITALS
WEIGHT: 216 LBS | BODY MASS INDEX: 40.78 KG/M2 | DIASTOLIC BLOOD PRESSURE: 62 MMHG | SYSTOLIC BLOOD PRESSURE: 120 MMHG | OXYGEN SATURATION: 97 % | HEIGHT: 61 IN | HEART RATE: 57 BPM

## 2022-09-01 DIAGNOSIS — I25.10 CORONARY ARTERY DISEASE INVOLVING NATIVE CORONARY ARTERY OF NATIVE HEART WITHOUT ANGINA PECTORIS: Primary | ICD-10-CM

## 2022-09-01 DIAGNOSIS — I73.9 PERIPHERAL VASCULAR DISEASE: ICD-10-CM

## 2022-09-01 DIAGNOSIS — I10 PRIMARY HYPERTENSION: ICD-10-CM

## 2022-09-01 DIAGNOSIS — E78.5 DYSLIPIDEMIA: ICD-10-CM

## 2022-09-01 DIAGNOSIS — E78.2 MIXED HYPERLIPIDEMIA: ICD-10-CM

## 2022-09-01 PROCEDURE — 99214 OFFICE O/P EST MOD 30 MIN: CPT | Performed by: INTERNAL MEDICINE

## 2022-09-01 RX ORDER — CANDESARTAN 8 MG/1
8 TABLET ORAL DAILY
COMMUNITY

## 2022-09-01 RX ORDER — EZETIMIBE 10 MG/1
10 TABLET ORAL DAILY
Qty: 90 TABLET | Refills: 3 | Status: SHIPPED | OUTPATIENT
Start: 2022-09-01

## 2022-09-01 RX ORDER — TIOTROPIUM BROMIDE AND OLODATEROL 3.124; 2.736 UG/1; UG/1
SPRAY, METERED RESPIRATORY (INHALATION)
COMMUNITY

## 2022-09-01 RX ORDER — ASPIRIN 81 MG/1
81 TABLET ORAL DAILY
Qty: 180 TABLET | Refills: 3 | Status: SHIPPED | OUTPATIENT
Start: 2022-09-01

## 2022-10-10 ENCOUNTER — TELEPHONE (OUTPATIENT)
Dept: CARDIOLOGY | Facility: CLINIC | Age: 72
End: 2022-10-10

## 2022-10-10 RX ORDER — NEBIVOLOL 10 MG/1
10 TABLET ORAL DAILY
COMMUNITY

## 2022-10-10 NOTE — TELEPHONE ENCOUNTER
Pt called to clarify medications. She stopped nebivolol when she started the Zetia.     Advised to restart nebivolol. Zetia to work with atorvastatin to lower LDL to <70.  She understands and medication list has been updated.

## 2022-10-18 ENCOUNTER — OFFICE VISIT (OUTPATIENT)
Dept: SLEEP MEDICINE | Facility: HOSPITAL | Age: 72
End: 2022-10-18

## 2022-10-18 VITALS
OXYGEN SATURATION: 97 % | HEIGHT: 61 IN | BODY MASS INDEX: 40.22 KG/M2 | SYSTOLIC BLOOD PRESSURE: 134 MMHG | DIASTOLIC BLOOD PRESSURE: 60 MMHG | HEART RATE: 56 BPM | WEIGHT: 213 LBS

## 2022-10-18 DIAGNOSIS — G47.33 OSA (OBSTRUCTIVE SLEEP APNEA): Primary | ICD-10-CM

## 2022-10-18 PROCEDURE — 99213 OFFICE O/P EST LOW 20 MIN: CPT | Performed by: NURSE PRACTITIONER

## 2022-10-18 NOTE — PROGRESS NOTES
Chief Complaint:   Chief Complaint   Patient presents with   • Follow-up       HPI:    Daina Flowers is a 72 y.o. female here for follow-up of sleep apnea.  Patient was last seen 10/19/2021.  Patient continues to do well with CPAP therapy.  Patient is sleeping 6 to 9 hours nightly and does feel rested upon awakening.  He will go to sleep within 5 minutes and does not get up during the night.  Patient has an [place] score of 7/24.  Patient states she is doing well without concerns or complaints and will continue therapy.        Current medications are:   Current Outpatient Medications:   •  aspirin 81 MG EC tablet, Take 1 tablet by mouth Daily., Disp: 180 tablet, Rfl: 3  •  atorvastatin (Lipitor) 80 MG tablet, Take 1 tablet by mouth Every Night. (Patient taking differently: Take 40 mg by mouth Every Other Day.), Disp: 30 tablet, Rfl: 11  •  candesartan (ATACAND) 8 MG tablet, Take 8 mg by mouth Daily., Disp: , Rfl:   •  clopidogrel (PLAVIX) 75 MG tablet, Take  by mouth daily., Disp: , Rfl:   •  Coenzyme Q10 200 MG capsule, Take 100 mg by mouth Daily., Disp: , Rfl:   •  ezetimibe (ZETIA) 10 MG tablet, Take 1 tablet by mouth Daily., Disp: 90 tablet, Rfl: 3  •  ferrous sulfate 324 (65 Fe) MG tablet delayed-release EC tablet, Take 324 mg by mouth Daily With Breakfast., Disp: , Rfl:   •  FLUoxetine (PROzac) 20 MG capsule, Take 20 mg by mouth Daily., Disp: , Rfl:   •  folic acid (FOLVITE) 800 MCG tablet, Take 400 mg by mouth daily., Disp: , Rfl:   •  furosemide (LASIX) 40 MG tablet, Take 40 mg by mouth Daily As Needed., Disp: , Rfl:   •  isosorbide dinitrate (ISORDIL) 30 MG tablet, Take  by mouth Daily., Disp: , Rfl:   •  nitroglycerin (NITROSTAT) 0.4 MG SL tablet, Place 1 tablet under the tongue Every 5 (Five) Minutes As Needed for Chest Pain., Disp: 20 tablet, Rfl: 3  •  Omega-3 Fatty Acids (FISH OIL) 1000 MG capsule capsule, Take  by mouth daily., Disp: , Rfl:   •  pantoprazole (PROTONIX) 40 MG EC tablet, Take  by  "mouth daily., Disp: , Rfl:   •  tiotropium bromide-olodaterol (Stiolto Respimat) 2.5-2.5 MCG/ACT aerosol solution inhaler, Inhale Daily., Disp: , Rfl:   •  nebivolol (BYSTOLIC) 10 MG tablet, Take 1 tablet by mouth Daily., Disp: , Rfl: .      The patient's relevant past medical, surgical, family and social history were reviewed and updated in Epic as appropriate.       Review of Systems   Eyes: Positive for visual disturbance.   Respiratory: Positive for apnea.    Cardiovascular: Positive for chest pain.   Gastrointestinal:        Heartburn   Psychiatric/Behavioral: Positive for dysphoric mood and sleep disturbance.   All other systems reviewed and are negative.        Objective:    Physical Exam  Constitutional:       Appearance: Normal appearance.   HENT:      Head: Normocephalic and atraumatic.      Mouth/Throat:      Comments: Class III airway  Cardiovascular:      Rate and Rhythm: Regular rhythm. Bradycardia present.   Pulmonary:      Effort: Pulmonary effort is normal.      Breath sounds: Normal breath sounds.   Skin:     General: Skin is warm and dry.   Neurological:      Mental Status: She is alert and oriented to person, place, and time.   Psychiatric:         Mood and Affect: Mood normal.         Behavior: Behavior normal.         Thought Content: Thought content normal.         Judgment: Judgment normal.     /60   Pulse 56   Ht 154.9 cm (61\")   Wt 96.6 kg (213 lb)   SpO2 97%   BMI 40.25 kg/m²       CPAP Report  30 for 30 days of use  Greater than 4-hour use 87%  9% pressure 10.5  AHI 5.0  The patient continues to use and benefit from CPAP therapy.    ASSESSMENT/PLAN    Diagnoses and all orders for this visit:    1. GENE (obstructive sleep apnea) (Primary)  -     PAP Therapy        1. Counseled patient regarding multimodal approach with healthy nutrition, healthy sleep, regular physical activity, social activities, counseling, and medications. Encouraged to practice lateral sleep position. Avoid " alcohol and sedatives close to bedtime.  2. Refill supplies x1 year.  Return to clinic 1 year or sooner symptoms warrant.      I have reviewed the results of my evaluation and impression and discussed my recommendations in detail with the patient.      Signed by  SONAL Mistry    October 18, 2022      CC: Lupe Tadeo APRN         No ref. provider found

## 2022-10-31 ENCOUNTER — TELEPHONE (OUTPATIENT)
Dept: SLEEP MEDICINE | Facility: HOSPITAL | Age: 72
End: 2022-10-31

## 2022-11-01 DIAGNOSIS — G47.33 OSA (OBSTRUCTIVE SLEEP APNEA): Primary | ICD-10-CM

## 2022-11-01 NOTE — TELEPHONE ENCOUNTER
PATIENT IS DESPERATE TO HAVE THE PRESSURE LOWERED.      PATIENT STATES THAT THE SETTING IS 8.5 AND NEEDS TO BE LOWERED TO 8.0    PATIENT USES [employer] [facility]    PLEASE ADVISE PT WHEN ORDER HAS BEEN FAXED.

## 2022-12-21 ENCOUNTER — TELEPHONE (OUTPATIENT)
Dept: CARDIOLOGY | Facility: CLINIC | Age: 72
End: 2022-12-21
Payer: MEDICARE

## 2022-12-21 RX ORDER — ISOSORBIDE MONONITRATE 30 MG/1
30 TABLET, EXTENDED RELEASE ORAL DAILY
COMMUNITY
End: 2023-01-12 | Stop reason: SDUPTHER

## 2022-12-21 NOTE — TELEPHONE ENCOUNTER
"Pt called to report she was seen by PCP and told to FU earlier with [facility] due to angina. She reports angina occurs 2-3 times weekly and is promptly relieved with SL NTG. She has not bee checking BP regularly but reports most are on the \"high side\". She is taking medications as prescribed and these were reviewed with the pt. We will attempt to optimize medical management based on last cath recommendations (3/4/2022). She will increase Imdur to 60 mg daily and take BP/HR daily for the next week. She understands if symptoms are unrelieved by SL NTG to activate EMS or go to ER.   Acceptable labs 8/2022    Update - 1/12/2023  Pt currently with COVID but she reports angina resolved with increase in Imdur to 30 mg BID. New Rx sent to mail order pharmacy.  "

## 2023-01-12 RX ORDER — ISOSORBIDE MONONITRATE 30 MG/1
30 TABLET, EXTENDED RELEASE ORAL 2 TIMES DAILY
Qty: 180 TABLET | Refills: 3 | Status: SHIPPED | OUTPATIENT
Start: 2023-01-12

## 2023-04-21 ENCOUNTER — TELEPHONE (OUTPATIENT)
Dept: CARDIOLOGY | Facility: CLINIC | Age: 73
End: 2023-04-21
Payer: MEDICARE

## 2023-04-21 NOTE — TELEPHONE ENCOUNTER
Pt needs cc for back surgery planned with Dr. Sarwat Choi. She has some arm pain but denies chest pain. Will work in for FU appt to assess cardiac risk. She is agreeable. Message to schedulers.

## 2023-05-12 ENCOUNTER — OFFICE VISIT (OUTPATIENT)
Dept: CARDIOLOGY | Facility: CLINIC | Age: 73
End: 2023-05-12
Payer: MEDICARE

## 2023-05-12 VITALS
WEIGHT: 210 LBS | SYSTOLIC BLOOD PRESSURE: 130 MMHG | BODY MASS INDEX: 39.65 KG/M2 | OXYGEN SATURATION: 97 % | HEIGHT: 61 IN | DIASTOLIC BLOOD PRESSURE: 60 MMHG | HEART RATE: 59 BPM

## 2023-05-12 DIAGNOSIS — I70.213 ATHEROSCLEROSIS OF NATIVE ARTERIES OF EXTREMITIES WITH INTERMITTENT CLAUDICATION, BILATERAL LEGS: ICD-10-CM

## 2023-05-12 DIAGNOSIS — I73.9 PERIPHERAL VASCULAR DISEASE: Primary | ICD-10-CM

## 2023-05-12 RX ORDER — ALPRAZOLAM 0.5 MG/1
TABLET ORAL
COMMUNITY
End: 2023-05-12

## 2023-05-12 NOTE — PROGRESS NOTES
[facility] Cardiology  Office Progress Note  Daina Flowers  1950  [address]  [facility] [place] [address]       Visit Date: 05/12/23    PCP: Lupe Tadeo, APRN  [address] [facility] [address]  [place] [address]    IDENTIFICATION: A 72 y.o. female resident of [place], [place]    PROBLEM LIST:   1. Coronary artery disease:  a. Multiple interventions with greater than 60-mm stent to RCA, 2000 to 2006.   b. January 2007, CABG x3 with LIMA to LAD, SVG to PDA, and SVG to posterolateral of the RCA, Dr. Siddiqi.  c. April 2014, [facility]: Patent SVG to PDA. Occluded SVG to PL(L-R collats) and atretic LIMA. Unchanged from 2011, normal LVEF. FFR- LAD, 60% CX-med Rx  d. 5/17 lexiscan wnl EF 62%  e. 6/21 MPS wnl EF 70%  f. 3/22 [facility] Dr. Bustillo: 70% proximal LAD stenosis with abnormal RFR.  Treated with 3.25 x 15 Xience TALIB.  Normal LVEF.  Occluded RCA with widely patent SVG.  Patent left circumflex vessels.  Left-to-right collaterals to right PL and right RV marginal branches.  g. 4/2023   2. Peripheral vascular disease:  a. In 1997, infrarenal abdominal aortic stent per Dr. Spain.  b. In 2006, carotid duplex, nonobstructive disease, minimal plaque noted.  c. April 2008, 5.0 x 27 bifurcational Express stents to iliac, ostium per Dr. العلي.  d. April 2008, AA with runoffs with patent infrarenal aortic stent with no evidence of compromise.  e. Discrepant upper extremity blood pressures with a 20-mm gradient left subclavian stenosis, status post subclavian angiography revealing proximal left subclavian stenosis not felt in need of revascularization at current, January 2011.  f. September 2012, mechanical thrombectomy with stent x2 to the right common iliac artery, stent to the left common iliac artery, bifurcation stent placement to the left external iliac and left common iliac arteries, Dr. Earl.  g. 7/2017 stent to LLE per [facility]   h. 10/20 LE LINDY wnl  3. Bilateral knee pain, followed  in [facility] per orthopedics:  a. Left total knee replacement surgery per Dr. Fierro, September 2015.  4. Hyperlipidemia.  1. 3/22 166/118/54/91  5. Obstructive sleep apnea on CPAP.  6. Nicotine addiction with smoking cessation, 2011.  7. Gastroesophageal reflux disease.  8. Depression, , 2006.   9. Surgical history:  a. Appendectomy.   b. Cholecystectomy.   c. infrarenal abdominal stent, Dr. Spain, 1997.        CC:   Chief Complaint   Patient presents with   • Cardiac Clearance     Back Surgery        Allergies  Allergies   Allergen Reactions   • Bactrim [Sulfamethoxazole-Trimethoprim] Itching   • Crestor [Rosuvastatin Calcium]    • Keflex [Cephalexin] Itching   • Lipitor [Atorvastatin] Other (See Comments)     high-dose with thrush.     • Lisinopril    • Lortab [Hydrocodone-Acetaminophen]    • Percocet [Oxycodone-Acetaminophen]    • Pravachol [Pravastatin Sodium]    • Zocor [Simvastatin] Myalgia     Muscle ache   • Diclofenac Sodium Rash   • Diphenhydramine Rash   • Estradiol Rash     Vaginal cream caused itching and rash   • Prednisone Rash   • Tizanidine Hcl Rash       Current Medications    Current Outpatient Medications:   •  aspirin 81 MG EC tablet, Take 1 tablet by mouth Daily., Disp: 180 tablet, Rfl: 3  •  atorvastatin (Lipitor) 80 MG tablet, Take 1 tablet by mouth Every Night. (Patient taking differently: Take 1 tablet by mouth Daily.), Disp: 30 tablet, Rfl: 11  •  candesartan (ATACAND) 8 MG tablet, Take 1 tablet by mouth Daily., Disp: , Rfl:   •  clopidogrel (PLAVIX) 75 MG tablet, Take  by mouth daily., Disp: , Rfl:   •  Coenzyme Q10 200 MG capsule, Take 100 mg by mouth Daily., Disp: , Rfl:   •  ezetimibe (ZETIA) 10 MG tablet, Take 1 tablet by mouth Daily., Disp: 90 tablet, Rfl: 3  •  ferrous sulfate 324 (65 Fe) MG tablet delayed-release EC tablet, Take 1 tablet by mouth Daily With Breakfast., Disp: , Rfl:   •  FLUoxetine (PROzac) 20 MG capsule, Take 1 capsule by mouth Daily., Disp: , Rfl:   •   "folic acid (FOLVITE) 800 MCG tablet, Take 500 tablets by mouth Daily., Disp: , Rfl:   •  furosemide (LASIX) 40 MG tablet, Take 1 tablet by mouth Daily As Needed., Disp: , Rfl:   •  isosorbide mononitrate (IMDUR) 30 MG 24 hr tablet, Take 1 tablet by mouth 2 (Two) Times a Day., Disp: 180 tablet, Rfl: 3  •  nebivolol (BYSTOLIC) 10 MG tablet, Take 1 tablet by mouth Daily., Disp: , Rfl:   •  nitroglycerin (NITROSTAT) 0.4 MG SL tablet, Place 1 tablet under the tongue Every 5 (Five) Minutes As Needed for Chest Pain., Disp: 20 tablet, Rfl: 3  •  Omega-3 Fatty Acids (FISH OIL) 1000 MG capsule capsule, Take  by mouth daily., Disp: , Rfl:   •  pantoprazole (PROTONIX) 40 MG EC tablet, Take  by mouth daily., Disp: , Rfl:   •  tiotropium bromide-olodaterol (Stiolto Respimat) 2.5-2.5 MCG/ACT aerosol solution inhaler, Inhale Daily., Disp: , Rfl:       History of Present Illness   Daina Flowers is a 72 y.o. year old female here for preoperative cardiac risk assessment ahead of back surgery with Dr. Sarwat Choi due to bilateral lower extremity pain.  Patient expressed her hesitation to proceed with surgery.  She is a known vasculopath following 40+ pack year smoking history and has had multiple peripheral arterial stents placed.     Pt denies any chest pain, dyspnea, dyspnea on exertion, orthopnea, PND, and palpitations.      OBJECTIVE:  Vitals:    05/12/23 1525   BP: 130/60   BP Location: Left arm   Patient Position: Sitting   Pulse: 59   SpO2: 97%   Weight: 95.3 kg (210 lb)   Height: 154.9 cm (61\")     Body mass index is 39.68 kg/m².    Constitutional:       Appearance: Healthy appearance. Not in distress.   Pulmonary:      Effort: Pulmonary effort is normal.      Breath sounds: Normal breath sounds. No wheezing. No rhonchi. No rales.   Chest:      Chest wall: Not tender to palpatation.   Cardiovascular:      Normal rate. Regular rhythm. Normal S1. Normal S2.      Murmurs: There is no murmur.      No gallop. No click. No rub. "   Pulses:     Decreased pulses.   Edema:     Peripheral edema absent.   Skin:     General: Skin is warm and dry.   Neurological:      General: No focal deficit present.      Mental Status: Alert and oriented to person, place and time.         ASSESSMENT:   Diagnosis Plan   1. Peripheral vascular disease  CT angio abdominal aorta bilat iliofem runoff w wo contrast      2. Atherosclerosis of native arteries of extremities with intermittent claudication, bilateral legs  CT angio abdominal aorta bilat iliofem runoff w wo contrast          PLAN:  PVD- CTA aorta with bilateral iliofemoral runoff to visualize prior stents and rule out peripheral vascular ischemia as a cause of her lower extremity pain.  We will comment on cardiovascular risk assessment ahead of potential back surgery following the scan.      Scribed by Kiet Ledesma PA-C for Octavio Barber MD, FACC

## 2023-08-08 ENCOUNTER — TELEPHONE (OUTPATIENT)
Dept: CARDIOLOGY | Facility: CLINIC | Age: 73
End: 2023-08-08
Payer: MEDICARE

## 2023-08-08 NOTE — TELEPHONE ENCOUNTER
Patient to have L4-L5 laminectomy with Dr. Sarwat Choi on 09/06/23. Patient is needing presurgical cardiac risk assessment and Plavix recommendations. Please advise.         F: Bertha @ [phone]

## 2023-08-23 NOTE — TELEPHONE ENCOUNTER
Delvin Santa - Just make sure she has not had any new chest pain, shortness of breath on exertion or other concerning symptoms. If she denies any changes, Cardiac risk is nonprohibitive for proposed surgery. Can hold Plavix 5 days ahead of surgery and restart as soon as surgeon OK's postop. Continue aspirin perioperatively

## 2024-04-22 RX ORDER — ISOSORBIDE MONONITRATE 30 MG/1
30 TABLET, EXTENDED RELEASE ORAL 2 TIMES DAILY
Qty: 180 TABLET | Refills: 1 | Status: SHIPPED | OUTPATIENT
Start: 2024-04-22 | End: 2024-04-29 | Stop reason: SDUPTHER

## 2024-04-29 RX ORDER — ISOSORBIDE MONONITRATE 30 MG/1
30 TABLET, EXTENDED RELEASE ORAL 2 TIMES DAILY
Qty: 180 TABLET | Refills: 1 | Status: SHIPPED | OUTPATIENT
Start: 2024-04-29

## 2024-05-20 ENCOUNTER — TELEPHONE (OUTPATIENT)
Dept: CARDIOLOGY | Facility: CLINIC | Age: 74
End: 2024-05-20
Payer: OTHER GOVERNMENT

## 2024-05-20 NOTE — TELEPHONE ENCOUNTER
Caller: Daina Flowers    Relationship to patient: Self    Best call back number: [phone]    Chief complaint: INTERMITTENT SHORTNESS OF BREATH    Type of visit: F/U APPT    Requested date: NEXT AVAILABLE       Additional notes:PLEASE CONTACT PATIENT WITH A SUITABLE DATE.

## 2024-05-21 NOTE — TELEPHONE ENCOUNTER
Spoke with pt would like an annual f/u appt with with Dr Barber. Does not have one scheduled. Pt endorses some soa , worse with activity . Has recently been to PCP who prescribed an inhaler, with some relief.  aware and will follow up with pt to schedule appointment. Pt verbalized understanding , no further questions or concerns.

## 2024-06-12 ENCOUNTER — TELEPHONE (OUTPATIENT)
Dept: SLEEP MEDICINE | Facility: CLINIC | Age: 74
End: 2024-06-12

## 2024-06-12 NOTE — TELEPHONE ENCOUNTER
Hub staff attempted to follow warm transfer process and was unsuccessful     Caller: Daina Flowers    Relationship to patient: Self    Best call back number: [phone]    Patient is needing: PT NEEDS TO KNOW WHERE TO GO TO GET CHIP DOWNLOADED BECAUSE [facility] IS CLOSED, NOBODY IS THERE. PLEASE CALL PT ASAP TO ADV.

## 2024-06-14 ENCOUNTER — OFFICE VISIT (OUTPATIENT)
Dept: SLEEP MEDICINE | Age: 74
End: 2024-06-14
Payer: MEDICARE

## 2024-06-14 ENCOUNTER — OFFICE VISIT (OUTPATIENT)
Dept: CARDIOLOGY | Facility: CLINIC | Age: 74
End: 2024-06-14
Payer: MEDICARE

## 2024-06-14 VITALS
DIASTOLIC BLOOD PRESSURE: 82 MMHG | OXYGEN SATURATION: 97 % | WEIGHT: 216 LBS | SYSTOLIC BLOOD PRESSURE: 134 MMHG | TEMPERATURE: 97 F | HEART RATE: 70 BPM | BODY MASS INDEX: 40.81 KG/M2

## 2024-06-14 VITALS
OXYGEN SATURATION: 97 % | SYSTOLIC BLOOD PRESSURE: 132 MMHG | BODY MASS INDEX: 40.67 KG/M2 | WEIGHT: 215.4 LBS | DIASTOLIC BLOOD PRESSURE: 53 MMHG | HEIGHT: 61 IN | HEART RATE: 71 BPM

## 2024-06-14 DIAGNOSIS — I73.9 PVD (PERIPHERAL VASCULAR DISEASE) WITH CLAUDICATION: ICD-10-CM

## 2024-06-14 DIAGNOSIS — I10 PRIMARY HYPERTENSION: ICD-10-CM

## 2024-06-14 DIAGNOSIS — G47.33 OBSTRUCTIVE SLEEP APNEA, ADULT: Primary | ICD-10-CM

## 2024-06-14 DIAGNOSIS — I25.10 CORONARY ARTERY DISEASE INVOLVING NATIVE CORONARY ARTERY OF NATIVE HEART WITHOUT ANGINA PECTORIS: Primary | ICD-10-CM

## 2024-06-14 DIAGNOSIS — E78.2 MIXED HYPERLIPIDEMIA: ICD-10-CM

## 2024-06-14 DIAGNOSIS — E66.01 MORBID (SEVERE) OBESITY DUE TO EXCESS CALORIES: ICD-10-CM

## 2024-06-14 PROCEDURE — 3075F SYST BP GE 130 - 139MM HG: CPT | Performed by: INTERNAL MEDICINE

## 2024-06-14 PROCEDURE — 99214 OFFICE O/P EST MOD 30 MIN: CPT | Performed by: INTERNAL MEDICINE

## 2024-06-14 PROCEDURE — 3078F DIAST BP <80 MM HG: CPT | Performed by: INTERNAL MEDICINE

## 2024-06-14 RX ORDER — ISOSORBIDE DINITRATE 30 MG/1
1 TABLET ORAL DAILY
COMMUNITY

## 2024-06-14 RX ORDER — TIOTROPIUM BROMIDE INHALATION SPRAY 1.56 UG/1
2 SPRAY, METERED RESPIRATORY (INHALATION)
COMMUNITY

## 2024-06-14 RX ORDER — ATORVASTATIN CALCIUM 40 MG/1
1 TABLET, FILM COATED ORAL DAILY
COMMUNITY

## 2024-06-14 RX ORDER — ALBUTEROL SULFATE 90 UG/1
AEROSOL, METERED RESPIRATORY (INHALATION)
COMMUNITY
Start: 2024-01-03

## 2024-06-14 RX ORDER — LANOLIN ALCOHOL/MO/W.PET/CERES
400 CREAM (GRAM) TOPICAL DAILY
COMMUNITY

## 2024-06-14 NOTE — PROGRESS NOTES
Sleep Clinic Follow Up Note    Chief Complaint  Sleep Apnea    Subjective     History of Present Illness (from previous encounter on 10/18/2022):  Daina Flowers is a 72 y.o. female here for follow-up of sleep apnea.  Patient was last seen 10/19/2021.  Patient continues to do well with CPAP therapy.  Patient is sleeping 6 to 9 hours nightly and does feel rested upon awakening.  He will go to sleep within 5 minutes and does not get up during the night.  Patient has an [place] score of 7/24.  Patient states she is doing well without concerns or complaints and will continue therapy. (End copied text).    Interval History:  Daina Flowers is a 73 y.o. female returns for follow up and compliance of PAP therapy. The patient was last seen on 10/18/2022 by Ms. Garcia. Overall the patient feels poor with regard to therapy. She is tired of using the device. The device appears to be working appropriately. On average the patient sleeps 6-11 hours per night. The patient wakes 0-1 times per night. She can tell the difference when she uses the device.    The patient reports the following changes to their medical and medication history since they were last seen:  None    Further details are as follows:      [place] Scale is (out of 24): Total score: 4     Weight:  Current Weight: 216 lb      The patient's relevant past medical, surgical, family, and social history reviewed and updated in Epic as appropriate.    PMH:    Past Medical History:   Diagnosis Date    Coronary artery disease     Depression 2006    Depression, , 2006.     Dyslipidemia     GERD (gastroesophageal reflux disease)     Hyperlipidemia     Knee pain     Nicotine addiction     Nicotine addiction with smoking cessation, 2011.    Obstructive sleep apnea on CPAP     Peripheral vascular disease     Primary hypertension 3/4/2022     Past Surgical History:   Procedure Laterality Date    ABDOMINAL AORTA STENT  1997    Infrarenal abdominal stent, Dr. Spain,  1997.    APPENDECTOMY      CARDIAC CATHETERIZATION      CARDIAC CATHETERIZATION Left 3/4/2022    Procedure: Left Heart Cath;  Surgeon: Dashawn Bustillo MD;  Location:  [facility];  Service: Cardiovascular;  Laterality: Left;    CARDIAC CATHETERIZATION N/A 3/4/2022    Procedure: Left Heart Cath;  Surgeon: Dashawn Bustillo MD;  Location:  [facility];  Service: Cardiovascular;  Laterality: N/A;    CATARACT EXTRACTION, BILATERAL  2019    CHOLECYSTECTOMY      CORONARY ANGIOPLASTY WITH STENT PLACEMENT Right 2000    Multiple interventions with greater than 60-mm stent to RCA, 2000 to 2006.     CORONARY ARTERY BYPASS GRAFT  01/2007 January 2007, CABG x3 with LIMA to LAD, SVG to PDA, and SVG to posterolateral of the RCA, Dr. Siddiqi.    ILIAC ARTERY STENT  04/2008 April 2008, 5.0 x 27 bifurcational Express stents to iliac, ostium per Dr. العلي.    ILIAC ARTERY STENT Left 09/2012 September 2012, mechanical thrombectomy with stent x2 to the right common iliac artery, stent to the left common iliac artery, bifurcation stent placement to the left external iliac and left common iliac arteries, Dr. Earl.    TOTAL KNEE ARTHROPLASTY Left 09/2015    Left total knee replacement surgery per Dr. Fierro, September 2015.     OB History    No obstetric history on file.       Allergies   Allergen Reactions    Amoxicillin Unknown - Low Severity    Bactrim [Sulfamethoxazole-Trimethoprim] Itching    Crestor [Rosuvastatin Calcium]     Keflex [Cephalexin] Itching    Levofloxacin Unknown - Low Severity    Lipitor [Atorvastatin] Other (See Comments)     high-dose with thrush.      Lisinopril     Lortab [Hydrocodone-Acetaminophen]     Percocet [Oxycodone-Acetaminophen]     Pravachol [Pravastatin Sodium]     Zocor [Simvastatin] Myalgia     Muscle ache    Diclofenac Sodium Rash    Diclofenac Sodium Rash    Diphenhydramine Rash    Estradiol Rash     Vaginal cream caused itching and rash    Prednisone  Rash    Tizanidine Rash    Tizanidine Hcl Rash       MEDS:  Prior to Admission medications    Medication Sig Start Date End Date Taking? Authorizing Provider   aspirin 81 MG EC tablet Take 1 tablet by mouth Daily. 9/1/22   Kiet Ledesma PA-C   atorvastatin (Lipitor) 80 MG tablet Take 1 tablet by mouth Every Night.  Patient taking differently: Take 1 tablet by mouth Daily. 3/4/22   Dashawn Bustillo MD   candesartan (ATACAND) 8 MG tablet Take 1 tablet by mouth Daily.    Giancarlo Rojas MD   clopidogrel (PLAVIX) 75 MG tablet Take  by mouth daily. 9/21/15   Giancarlo Rojas MD   Coenzyme Q10 200 MG capsule Take 100 mg by mouth Daily.    Giancarlo Rojas MD   ezetimibe (ZETIA) 10 MG tablet Take 1 tablet by mouth Daily. 9/1/22   Kiet Ledesma PA-C   ferrous sulfate 324 (65 Fe) MG tablet delayed-release EC tablet Take 1 tablet by mouth Daily With Breakfast.    Giancarlo Rojas MD   FLUoxetine (PROzac) 20 MG capsule Take 1 capsule by mouth Daily.    Giancarlo Rojas MD   folic acid (FOLVITE) 800 MCG tablet Take 500 tablets by mouth Daily. 9/21/15   Giancarlo Rojas MD   furosemide (LASIX) 40 MG tablet Take 1 tablet by mouth Daily As Needed.    Giancarlo Rojas MD   isosorbide mononitrate (IMDUR) 30 MG 24 hr tablet Take 1 tablet by mouth 2 (Two) Times a Day. Pt needs  annual appointment for further refills. 4/29/24   Octavio Barber MD   nebivolol (BYSTOLIC) 10 MG tablet Take 1 tablet by mouth Daily.    Giancarlo Rojas MD   nitroglycerin (NITROSTAT) 0.4 MG SL tablet Place 1 tablet under the tongue Every 5 (Five) Minutes As Needed for Chest Pain. 4/15/19   Octavio Barber MD   Omega-3 Fatty Acids (FISH OIL) 1000 MG capsule capsule Take  by mouth daily. 9/21/15   Giancarlo Rojas MD   pantoprazole (PROTONIX) 40 MG EC tablet Take  by mouth daily. 9/21/15   Giancarlo Rojas MD   tiotropium bromide-olodaterol (Stiolto Respimat) 2.5-2.5 MCG/ACT aerosol solution inhaler  Inhale Daily.    Provider, MD Giancarlo         FH:  Family History   Problem Relation Age of Onset    Heart disease Mother     Hypertension Mother     Heart attack Mother     Stroke Father        Objective   Vital Signs:  /82   Pulse 70   Temp 97 °F (36.1 °C) (Infrared)   Wt 98 kg (216 lb)   SpO2 97%   BMI 40.81 kg/m²     Class 3 Severe Obesity (BMI >=40). Obesity-related health conditions include the following: obstructive sleep apnea. Obesity is unchanged. BMI is is above average; BMI management plan is completed. We discussed portion control and increasing exercise.        Physical Exam  Vitals reviewed.   Constitutional:       Appearance: Normal appearance.   HENT:      Head: Normocephalic and atraumatic.      Nose: Nose normal.      Mouth/Throat:      Mouth: Mucous membranes are moist.   Cardiovascular:      Rate and Rhythm: Normal rate and regular rhythm.      Heart sounds: No murmur heard.     No friction rub. No gallop.   Pulmonary:      Effort: Pulmonary effort is normal. No respiratory distress.      Breath sounds: Normal breath sounds. No wheezing or rhonchi.   Neurological:      Mental Status: She is alert and oriented to person, place, and time.   Psychiatric:         Behavior: Behavior normal.               Result Review :           PAP Report:  AHI: 5.3/h  Days of Usage: 80/90 (88.9%)  Number of Days Greater than 4 hours: 76.7%  Average time (days used): 6 hours 13 minutes  90th Percentile Pressure: 9.6 cmH2O  Settings: Auto CPAP-a flex-8/18 cm H2O, a flex-2, ramp time 30 minutes, ramp start pressure 8 cm H2O       Assessment and Plan  Daina Flowers is a 73 y.o. female who returns for follow-up and compliance of PAP therapy.  The Pap report has been reviewed.  Overall usage is 88.9% with compliance at 76.7%.  The patient averages 6 hours and 13 minutes of therapy.  Sleep apnea is well-controlled with an AHI of 5.3/H. I will refill the patient's supplies, and I have asked them to  return for follow-up and compliance in 1 year or sooner should they have further questions or concerns.     Diagnoses and all orders for this visit:    1. Obstructive sleep apnea, adult (Primary)  -     PAP Therapy    2. Morbid (severe) obesity due to excess calories         The patient continues to use and benefit from PAP therapy.    1. The patient was counseled regarding multimodal approach with healthy nutrition, healthy sleep, regular physical activity, social activities, counseling, and medications. Encouraged to practice lateral sleep position. Avoid alcohol and sedatives close to bedtime.     2.  We will refill supplies x1 year.  Return to clinic 1 year or sooner if symptoms warrant. I have reviewed the results of my evaluation and impression and discussed my recommendations in detail with the patient.           Follow Up  Return in about 1 year (around 6/14/2025) for Annual visit.  Patient was given instructions and counseling regarding her condition or for health maintenance advice. Please see specific information pulled into the AVS if appropriate.       SONAL Lewis, [facility]-BC  Pulmonology, Critical Care, and Sleep Medicine

## 2024-06-14 NOTE — PROGRESS NOTES
[facility] Cardiology  Office Progress Note  Daina Flowers  1950  [address]  [facility] [address]       Visit Date: 06/14/24    PCP: Lupe Tadeo, APRN  [address] [facility] [address]  [place] [address]    IDENTIFICATION: A 73 y.o.  female  resident of [place], [place]     PROBLEM LIST:   Coronary artery disease:  Multiple interventions with greater than 60-mm stent to RCA, 2000 to 2006.   January 2007, CABG x3 with LIMA to LAD, SVG to PDA, and SVG to posterolateral of the RCA, Dr. Siddiqi.  April 2014, [facility]: Patent SVG to PDA. Occluded SVG to PL(L-R collats) and atretic LIMA. Unchanged from 2011, normal LVEF. FFR- LAD, 60% CX-med Rx  5/17 lexiscan wnl EF 62%  6/21 MPS wnl EF 70%  3/22 [facility] Dr. Bustillo: 70% proximal LAD stenosis with abnormal RFR.  Treated with 3.25 x 15 Xience TALIB.  Normal LVEF.  Occluded RCA with widely patent SVG.  Patent left circumflex vessels.  Left-to-right collaterals to right PL and right RV marginal branches.  4/2023   Peripheral vascular disease:  In 1997, infrarenal abdominal aortic stent per Dr. Spain.  In 2006, carotid duplex, nonobstructive disease, minimal plaque noted.  April 2008, 5.0 x 27 bifurcational Express stents to iliac, ostium per Dr. العلي.  April 2008, AA with runoffs with patent infrarenal aortic stent with no evidence of compromise.  Discrepant upper extremity blood pressures with a 20-mm gradient left subclavian stenosis, status post subclavian angiography revealing proximal left subclavian stenosis not felt in need of revascularization at current, January 2011.  September 2012, mechanical thrombectomy with stent x2 to the right common iliac artery, stent to the left common iliac artery, bifurcation stent placement to the left external iliac and left common iliac arteries, Dr. Earl.  7/2017 stent to LLE per [facility]   10/20 LE LINDY wnl  Bilateral knee pain, followed in [facility] per orthopedics:  Left total  knee replacement surgery per Dr. Fierro, September 2015.  Hyperlipidemia.  3/22 166/118/54/91  5/24  [phone]  Obstructive sleep apnea on CPAP.  Nicotine addiction with smoking cessation, 2011.  Gastroesophageal reflux disease.  Stable lung nodules, CT chest 5/2024  Depression, , 2006.   Surgical history:  Appendectomy.   Cholecystectomy.   Infrarenal abdominal stent, Dr. Spain, 1997.      CC:   Chief Complaint   Patient presents with    Peripheral Vascular Disease       Allergies  Allergies   Allergen Reactions    Bactrim [Sulfamethoxazole-Trimethoprim] Itching    Crestor [Rosuvastatin Calcium]     Keflex [Cephalexin] Itching    Lipitor [Atorvastatin] Other (See Comments)     high-dose with thrush.      Lisinopril     Lortab [Hydrocodone-Acetaminophen]     Percocet [Oxycodone-Acetaminophen]     Pravachol [Pravastatin Sodium]     Zocor [Simvastatin] Myalgia     Muscle ache    Diclofenac Sodium Rash    Diphenhydramine Rash    Estradiol Rash     Vaginal cream caused itching and rash    Prednisone Rash    Tizanidine Hcl Rash       Current Medications  Current Outpatient Medications   Medication Instructions    aspirin 81 mg, Oral, Daily    atorvastatin (LIPITOR) 80 mg, Oral, Nightly    candesartan (ATACAND) 8 mg, Oral, Daily    clopidogrel (PLAVIX) 75 MG tablet Oral, Daily    Coenzyme Q10 100 mg, Oral, Daily    ezetimibe (ZETIA) 10 mg, Oral, Daily    ferrous sulfate 324 mg, Oral, Daily With Breakfast    FLUoxetine (PROZAC) 20 mg, Oral, Daily    folic acid (FOLVITE) 400 mg, Oral, Daily    furosemide (LASIX) 40 mg, Oral, Daily PRN    isosorbide mononitrate (IMDUR) 30 mg, Oral, 2 Times Daily, Pt needs  annual appointment for further refills.    nebivolol (BYSTOLIC) 10 mg, Oral, Daily    nitroglycerin (NITROSTAT) 0.4 mg, Sublingual, Every 5 Minutes PRN    Omega-3 Fatty Acids (FISH OIL) 1000 MG capsule capsule Oral, Daily    pantoprazole (PROTONIX) 40 MG EC tablet Oral, Daily    tiotropium bromide-olodaterol  "(Stiolto Respimat) 2.5-2.5 MCG/ACT aerosol solution inhaler Inhalation, Daily - RT        History of Present Illness   Daina Flowers is a 73 y.o. year old female here for follow up.  No new issues.  She did have back surgery and states had about 90 days of relief but is now returned.  She notes no angina nor claudication.  She does play cards with friends but states she wants to go to the beach later this year with her sister        OBJECTIVE:  Vitals:    06/14/24 1105   BP: 132/53   Pulse: 71   SpO2: 97%   Weight: 97.7 kg (215 lb 6.4 oz)   Height: 154.9 cm (61\")     Body mass index is 40.7 kg/m².    Constitutional:       Appearance: Healthy appearance. Not in distress.   Neck:      Vascular: No JVR. JVD normal.   Pulmonary:      Effort: Pulmonary effort is normal.      Breath sounds: Normal breath sounds. No wheezing. No rhonchi. No rales.   Chest:      Chest wall: Not tender to palpatation.   Cardiovascular:      PMI at left midclavicular line. Normal rate. Regular rhythm. Normal S1. Normal S2.       Murmurs: There is no murmur.      No gallop.  No click. No rub.   Pulses:     Intact distal pulses.   Edema:     Peripheral edema present.  Abdominal:      General: Bowel sounds are normal.      Palpations: Abdomen is soft.      Tenderness: There is no abdominal tenderness.   Musculoskeletal: Normal range of motion.         General: No tenderness. Skin:     General: Skin is warm and dry.   Neurological:      General: No focal deficit present.      Mental Status: Alert and oriented to person, place and time.         Diagnostic Data:  Procedures    Advance Care Planning   ACP discussion was held with the patient during this visit. Patient has an advance directive (not in EMR), copy requested.         ASSESSMENT:   Diagnosis Plan   1. Coronary artery disease involving native coronary artery of native heart without angina pectoris        2. PVD (peripheral vascular disease) with claudication        3. Primary " hypertension        4. Mixed hyperlipidemia            PLAN:  CAD post revascularization with no coronary symptoms at current continue GDMT    PVD post revascularization with no claudication    Hypertension controlled current candesartan nebivolol    Mixed dyslipidemia controlled on atorvastatin and Zetia        Octavio Barber MD, FACC

## 2024-10-02 ENCOUNTER — TELEPHONE (OUTPATIENT)
Dept: CARDIOLOGY | Facility: CLINIC | Age: 74
End: 2024-10-02

## 2024-10-02 NOTE — TELEPHONE ENCOUNTER
REQUEST FOR CARDIAC CLEARANCE    Caller name: KAITLIN FROM [facility]     Phone Number: [phone] EXT [phone] VM OK    Surgeon's name: DR. KENNY RODRIGUEZ    Type of planned surgery: MRI AUTHOGRAM OF RIGHT SHOULDER, THEY WILL INJECT CONTRAST INTO THE JOINTS TO CHECK FOR TEARS    Date of planned surgery: ASAP    Type of anesthesia: NO    Have you been experiencing chest pain or shortness of breath? NOT TO THEIR KNOWLEDGE     Is your doctor requesting for you to stop any of your medications prior to your surgery? NOT TO THEIR KNOWLEDGE    Where should we fax the clearance to? [phone]      THEY WANT TO MAKE SURE IT IS OKAY FOR PT TO RECEIVE MRI WITH THE STINTS SHE HAS. KAITLIN IS WANTING TO KNOW WHO THE MANUFACTURE IS THAT MADE THE STINTS, AND WHO DID THE PROCEDURE ON THE PT. PLEASE CALL KAITLIN. THANK YOU

## 2025-04-25 ENCOUNTER — TELEPHONE (OUTPATIENT)
Dept: CARDIOLOGY | Facility: CLINIC | Age: 75
End: 2025-04-25

## 2025-04-25 NOTE — TELEPHONE ENCOUNTER
Caller: Daina Flowers    Relationship: Self    Best call back number: [phone]     What is the best time to reach you: ANY    Who are you requesting to speak with (clinical staff, provider,  specific staff member): CLINICAL        What was the call regarding: PATIENT STATES SHE MISSED A CALL FROM THE OFFICE ON 4-25-25. PLEASE CONTACT PATIENT IN REGARDS TO THIS ISSUE.    Is it okay if the provider responds through [employer]: PLEASE CALL

## 2025-05-23 ENCOUNTER — HOSPITAL ENCOUNTER (OUTPATIENT)
Facility: HOSPITAL | Age: 75
Discharge: HOME | End: 2025-05-23
Payer: MEDICARE

## 2025-05-23 ENCOUNTER — OFFICE VISIT (OUTPATIENT)
Dept: CARDIOLOGY | Facility: CLINIC | Age: 75
End: 2025-05-23
Payer: MEDICARE

## 2025-05-23 ENCOUNTER — TELEPHONE (OUTPATIENT)
Dept: CARDIOLOGY | Facility: CLINIC | Age: 75
End: 2025-05-23

## 2025-05-23 VITALS
HEIGHT: 61 IN | BODY MASS INDEX: 40.22 KG/M2 | HEART RATE: 53 BPM | OXYGEN SATURATION: 93 % | SYSTOLIC BLOOD PRESSURE: 154 MMHG | WEIGHT: 213 LBS | DIASTOLIC BLOOD PRESSURE: 70 MMHG

## 2025-05-23 VITALS
DIASTOLIC BLOOD PRESSURE: 64 MMHG | HEIGHT: 61 IN | WEIGHT: 212.96 LBS | BODY MASS INDEX: 40.21 KG/M2 | SYSTOLIC BLOOD PRESSURE: 154 MMHG

## 2025-05-23 DIAGNOSIS — R06.09 DOE (DYSPNEA ON EXERTION): Primary | ICD-10-CM

## 2025-05-23 DIAGNOSIS — R06.09 DOE (DYSPNEA ON EXERTION): ICD-10-CM

## 2025-05-23 LAB
AORTIC DIMENSIONLESS INDEX: 0.7 (DI)
AV MEAN PRESS GRAD SYS DOP V1V2: 4 MMHG
AV VMAX SYS DOP: 129 CM/SEC
BH CV ECHO MEAS - AO MAX PG: 6.7 MMHG
BH CV ECHO MEAS - AO ROOT DIAM: 2.5 CM
BH CV ECHO MEAS - AO V2 VTI: 34.3 CM
BH CV ECHO MEAS - AVA(I,D): 1.77 CM2
BH CV ECHO MEAS - EDV(CUBED): 110.6 ML
BH CV ECHO MEAS - EDV(MOD-SP2): 58.5 ML
BH CV ECHO MEAS - EDV(MOD-SP4): 77 ML
BH CV ECHO MEAS - EF(MOD-SP2): 65 %
BH CV ECHO MEAS - EF(MOD-SP4): 62.7 %
BH CV ECHO MEAS - ESV(CUBED): 27 ML
BH CV ECHO MEAS - ESV(MOD-SP2): 20.5 ML
BH CV ECHO MEAS - ESV(MOD-SP4): 28.7 ML
BH CV ECHO MEAS - FS: 37.5 %
BH CV ECHO MEAS - IVS/LVPW: 0.88 CM
BH CV ECHO MEAS - IVSD: 0.7 CM
BH CV ECHO MEAS - LA DIMENSION: 3.9 CM
BH CV ECHO MEAS - LAT PEAK E' VEL: 9 CM/SEC
BH CV ECHO MEAS - LV DIASTOLIC VOL/BSA (35-75): 39.7 CM2
BH CV ECHO MEAS - LV MASS(C)D: 116.6 GRAMS
BH CV ECHO MEAS - LV MAX PG: 2.9 MMHG
BH CV ECHO MEAS - LV MEAN PG: 2 MMHG
BH CV ECHO MEAS - LV SYSTOLIC VOL/BSA (12-30): 14.8 CM2
BH CV ECHO MEAS - LV V1 MAX: 85.7 CM/SEC
BH CV ECHO MEAS - LV V1 VTI: 23.9 CM
BH CV ECHO MEAS - LVIDD: 4.8 CM
BH CV ECHO MEAS - LVIDS: 3 CM
BH CV ECHO MEAS - LVOT AREA: 2.5 CM2
BH CV ECHO MEAS - LVOT DIAM: 1.8 CM
BH CV ECHO MEAS - LVPWD: 0.8 CM
BH CV ECHO MEAS - MED PEAK E' VEL: 7.8 CM/SEC
BH CV ECHO MEAS - MV A MAX VEL: 34.6 CM/SEC
BH CV ECHO MEAS - MV DEC SLOPE: 798 CM/SEC2
BH CV ECHO MEAS - MV DEC TIME: 0.13 SEC
BH CV ECHO MEAS - MV E MAX VEL: 103 CM/SEC
BH CV ECHO MEAS - MV E/A: 3
BH CV ECHO MEAS - MV MAX PG: 5.7 MMHG
BH CV ECHO MEAS - MV MEAN PG: 2 MMHG
BH CV ECHO MEAS - MV V2 VTI: 39.7 CM
BH CV ECHO MEAS - MVA(VTI): 1.53 CM2
BH CV ECHO MEAS - PA ACC TIME: 0.11 SEC
BH CV ECHO MEAS - PA V2 MAX: 96.2 CM/SEC
BH CV ECHO MEAS - RAP SYSTOLE: 3 MMHG
BH CV ECHO MEAS - RVSP: 26 MMHG
BH CV ECHO MEAS - SV(LVOT): 60.8 ML
BH CV ECHO MEAS - SV(MOD-SP2): 38 ML
BH CV ECHO MEAS - SV(MOD-SP4): 48.3 ML
BH CV ECHO MEAS - SVI(LVOT): 31.3 ML/M2
BH CV ECHO MEAS - SVI(MOD-SP2): 19.6 ML/M2
BH CV ECHO MEAS - SVI(MOD-SP4): 24.9 ML/M2
BH CV ECHO MEAS - TAPSE (>1.6): 1.65 CM
BH CV ECHO MEAS - TR MAX PG: 23.4 MMHG
BH CV ECHO MEAS - TR MAX VEL: 242 CM/SEC
BH CV ECHO MEASUREMENTS AVERAGE E/E' RATIO: 12.26
BH CV XLRA - RV BASE: 3 CM
BH CV XLRA - RV LENGTH: 5.5 CM
BH CV XLRA - RV MID: 2 CM
BH CV XLRA - TDI S': 4.8 CM/SEC
IVRT: 153 MS
LEFT ATRIUM VOLUME INDEX: 21.3 ML/M2
LV EF BIPLANE MOD: 62.8 %

## 2025-05-23 PROCEDURE — 99214 OFFICE O/P EST MOD 30 MIN: CPT | Performed by: INTERNAL MEDICINE

## 2025-05-23 PROCEDURE — 3078F DIAST BP <80 MM HG: CPT | Performed by: INTERNAL MEDICINE

## 2025-05-23 PROCEDURE — 25010000002 SULFUR HEXAFLUORIDE MICROSPH 60.7-25 MG RECONSTITUTED SUSPENSION: Performed by: INTERNAL MEDICINE

## 2025-05-23 PROCEDURE — 3077F SYST BP >= 140 MM HG: CPT | Performed by: INTERNAL MEDICINE

## 2025-05-23 PROCEDURE — 93306 TTE W/DOPPLER COMPLETE: CPT | Performed by: INTERNAL MEDICINE

## 2025-05-23 PROCEDURE — 93306 TTE W/DOPPLER COMPLETE: CPT

## 2025-05-23 RX ADMIN — SULFUR HEXAFLUORIDE 2 ML: KIT at 14:06

## 2025-05-23 NOTE — TELEPHONE ENCOUNTER
Octavio Barber MD Jackson, Kristina, RN  Echo wnl    Pt called  informed of the above results, verbalized understanding.

## 2025-05-23 NOTE — PROGRESS NOTES
[facility] Cardiology  Office Progress Note  Daina Flowers  1950  [address]  [facility] [place] [address]       Visit Date: 05/23/25    PCP: Lupe Tadeo, APRN  [address] [facility] [address]  [place] [address]    IDENTIFICATION: A 74 y.o.  female  resident of [place], [place]     PROBLEM LIST:   Coronary artery disease:  Multiple interventions with greater than 60-mm stent to RCA, 2000 to 2006.   January 2007, CABG x3 with LIMA to LAD, SVG to PDA, and SVG to posterolateral of the RCA, Dr. Siddiqi.  April 2014, [facility]: Patent SVG to PDA. Occluded SVG to PL(L-R collats) and atretic LIMA. Unchanged from 2011, normal LVEF. FFR- LAD, 60% CX-med Rx  5/17 lexiscan wnl EF 62%  6/21 MPS wnl EF 70%  3/22 [facility] Dr. Bustillo: 70% proximal LAD stenosis with abnormal RFR.  Treated with 3.25 x 15 Xience TALIB.  Normal LVEF.  Occluded RCA with widely patent SVG.  Patent left circumflex vessels.  Left-to-right collaterals to right PL and right RV marginal branches.  4/2023   Peripheral vascular disease:  In 1997, infrarenal abdominal aortic stent per Dr. Spain.  In 2006, carotid duplex, nonobstructive disease, minimal plaque noted.  April 2008, 5.0 x 27 bifurcational Express stents to iliac, ostium per Dr. العلي.  April 2008, AA with runoffs with patent infrarenal aortic stent with no evidence of compromise.  Discrepant upper extremity blood pressures with a 20-mm gradient left subclavian stenosis, status post subclavian angiography revealing proximal left subclavian stenosis not felt in need of revascularization at current, January 2011.  September 2012, mechanical thrombectomy with stent x2 to the right common iliac artery, stent to the left common iliac artery, bifurcation stent placement to the left external iliac and left common iliac arteries, Dr. Earl.  7/2017 stent to LLE per [facility]   10/20 LE LINDY wnl  Bilateral knee pain, followed in [facility] per orthopedics:  Left total  knee replacement surgery per Dr. Fierro, September 2015.  Hyperlipidemia.  3/22 166/118/54/91  5/24  [phone]  Obstructive sleep apnea on CPAP.  Nicotine addiction with smoking cessation, 2011.  Gastroesophageal reflux disease.  Stable lung nodules, CT chest 5/2024  Depression, , 2006.   Surgical history:  Appendectomy.   Cholecystectomy.   Infrarenal abdominal stent, Dr. Spain, 1997.      CC:   Chief Complaint   Patient presents with    Coronary artery disease involving native coronary artery of       Allergies  Allergies   Allergen Reactions    Amoxicillin Unknown - Low Severity    Bactrim [Sulfamethoxazole-Trimethoprim] Itching    Crestor [Rosuvastatin Calcium]     Keflex [Cephalexin] Itching    Levofloxacin Unknown - Low Severity    Lipitor [Atorvastatin] Other (See Comments)     high-dose with thrush.      Lisinopril     Lortab [Hydrocodone-Acetaminophen]     Percocet [Oxycodone-Acetaminophen]     Pravachol [Pravastatin Sodium]     Zocor [Simvastatin] Myalgia     Muscle ache    Diclofenac Sodium Rash    Diclofenac Sodium Rash    Diphenhydramine Rash    Estradiol Rash     Vaginal cream caused itching and rash    Prednisone Rash    Tizanidine Rash    Tizanidine Hcl Rash       Current Medications  Current Outpatient Medications   Medication Instructions    albuterol sulfate  (90 Base) MCG/ACT inhaler inhale 1 - 2 puffs (90 - 180 mcg) by inhalation route every 4 hours as needed    aspirin 81 mg, Oral, Daily    atorvastatin (LIPITOR) 40 MG tablet 1 tablet, Daily    candesartan (ATACAND) 8 mg, Daily    clopidogrel (PLAVIX) 75 MG tablet Daily    Coenzyme Q10 100 mg, Daily    ezetimibe (ZETIA) 10 mg, Oral, Daily    ferrous sulfate 324 mg, Daily With Breakfast    FLUoxetine (PROZAC) 20 mg, Daily    Fluticasone-Umeclidin-Vilant (TRELEGY ELLIPTA) 200-62.5-25 MCG/ACT inhaler 1 puff, Daily - RT    folic acid (FOLVITE) 400 mcg, Daily    furosemide (LASIX) 40 mg, Daily PRN    isosorbide dinitrate (ISORDIL)  "30 MG tablet 1 tablet, Daily    nebivolol (BYSTOLIC) 10 mg, Daily    nitroglycerin (NITROSTAT) 0.4 mg, Sublingual, Every 5 Minutes PRN    Omega-3 Fatty Acids (FISH OIL) 1000 MG capsule capsule Daily    pantoprazole (PROTONIX) 40 MG EC tablet Daily        History of Present Illness   Daina Flowers is a 74 y.o. year old female here for follow up.  Crescendo dyspnea with most any activity.  She is taking Lasix 3 times weekly.  States she was diagnosed with \"pneumonia\" for 3 consistent months      OBJECTIVE:  Vitals:    05/23/25 1122   BP: 154/70   BP Location: Right arm   Patient Position: Sitting   Cuff Size: Adult   Pulse: 53   SpO2: 93%   Weight: 96.6 kg (213 lb)   Height: 154.9 cm (61\")       Body mass index is 40.25 kg/m².    Constitutional:       Appearance: Healthy appearance. Not in distress.   Neck:      Vascular: No JVR. JVD normal.   Pulmonary:      Effort: Pulmonary effort is normal.      Breath sounds: Normal breath sounds. No wheezing. No rhonchi. No rales.   Chest:      Chest wall: Not tender to palpatation.   Cardiovascular:      PMI at left midclavicular line. Normal rate. Regular rhythm. Normal S1. Normal S2.       Murmurs: There is no murmur.      No gallop.  No click. No rub.   Pulses:     Intact distal pulses.   Edema:     Peripheral edema present.  Abdominal:      General: Bowel sounds are normal.      Palpations: Abdomen is soft.      Tenderness: There is no abdominal tenderness.   Musculoskeletal: Normal range of motion.         General: No tenderness. Skin:     General: Skin is warm and dry.   Neurological:      General: No focal deficit present.      Mental Status: Alert and oriented to person, place and time.         Diagnostic Data:  Procedures    Advance Care Planning   ACP discussion was held with the patient during this visit. Patient has an advance directive (not in EMR), copy requested.         ASSESSMENT:   Diagnosis Plan   1. ZEPEDA (dyspnea on exertion)  Adult Transthoracic Echo " Complete W/ Cont if Necessary Per Protocol            PLAN:  CAD post revascularization with no coronary symptoms at current continue GDMT    PVD post revascularization with no claudication    Hypertension controlled current candesartan nebivolol    Mixed dyslipidemia controlled on atorvastatin and Zetia    HFpEF will initiate Jardiance 10 and document echocardiogram today    Octavio Barber MD, FACC

## 2025-05-25 NOTE — PATIENT INSTRUCTIONS
Screening for Sleep Apnea    Sleep apnea is a condition in which breathing pauses or becomes shallow during sleep. Sleep apnea screening is a test to determine if you are at risk for sleep apnea. The test is easy and only takes a few minutes. Your health care provider may ask you to have this test in preparation for surgery or as part of a physical exam.  What are the symptoms of sleep apnea?  Common symptoms of sleep apnea include:  Snoring.  Restless sleep.  Daytime sleepiness.  Pauses in breathing.  Choking during sleep.  Irritability.  Forgetfulness.  Trouble thinking clearly.  Depression.  Personality changes.  Most people with sleep apnea are not aware that they have it.  Why should I get screened?  Getting screened for sleep apnea can help:  Ensure your safety. It is important for your health care providers to know whether or not you have sleep apnea, especially if you are having surgery or have other long-term (chronic) health conditions.  Improve your health and allow you to get a better night's rest. Restful sleep can help you:  Have more energy.  Lose weight.  Improve high blood pressure.  Improve diabetes management.  Prevent stroke.  Prevent car accidents.  How is screening done?  Screening usually includes being asked a list of questions about your sleep quality. Some questions you may be asked include:  Do you snore?  Is your sleep restless?  Do you have daytime sleepiness?  Has a partner or spouse told you that you stop breathing during sleep?  Have you had trouble concentrating or memory loss?  If your screening test is positive, you are at risk for the condition. Further testing may be needed to confirm a diagnosis of sleep apnea.  Where to find more information  You can find screening tools online or at your health care clinic. For more information about sleep apnea screening and healthy sleep, visit these websites:  Centers for Disease Control and Prevention:  www.cdc.gov/sleep/index.html  American Sleep Apnea Association: www.sleepapnea.org  Contact a health care provider if:  You think that you may have sleep apnea.  Summary  Sleep apnea screening can help determine if you are at risk for sleep apnea.  It is important for your health care providers to know whether or not you have sleep apnea, especially if you are having surgery or have other chronic health conditions.  You may be asked to take a screening test for sleep apnea in preparation for surgery or as part of a physical exam.  This information is not intended to replace advice given to you by your health care provider. Make sure you discuss any questions you have with your health care provider.  Document Revised: 10/04/2019 Document Reviewed: 03/30/2018  Elsevier Patient Education © 2021 [employer] Inc.       <<--- Click to launch

## 2025-06-20 NOTE — PROGRESS NOTES
Sleep Clinic Visit Follow Up Note    Chief Complaint  Follow-up and compliance of PAP therapy    Subjective     History of Present Illness (from previous encounter on 6/14/2024):  Daina Flowers is a 73 y.o. female who returns for follow-up and compliance of PAP therapy.  The Pap report has been reviewed.  Overall usage is 88.9% with compliance at 76.7%.  The patient averages 6 hours and 13 minutes of therapy.  Sleep apnea is well-controlled with an AHI of 5.3/H. I will refill the patient's supplies, and I have asked them to return for follow-up and compliance in 1 year or sooner should they have further questions or concerns. (End copied text)    Interval History:  Daina Flowers is a 74 y.o. female returns for follow up and compliance of PAP therapy. The patient was last seen on 6/14/2024 by me. Overall the patient feels poor with regard to therapy. The device appears to be working appropriately. On average the patient sleeps 6-10 hours per night. The patient wake 0 times per night.     The patient reports the following changes to their medical and medication history since they were last seen:  Heart Failure  Started multiple diuretics  pneumonia    Further details are as follows:    [place] Scale is: 7/24      Weight:  Current Weight: 214 lb      The patient's relevant past medical, surgical, family, and social history reviewed and updated in Epic as appropriate.    PMH:    Past Medical History:   Diagnosis Date    Coronary artery disease     Depression 2006    Depression, , 2006.     Dyslipidemia     GERD (gastroesophageal reflux disease)     Hyperlipidemia     Knee pain     Nicotine addiction     Nicotine addiction with smoking cessation, 2011.    Obstructive sleep apnea on CPAP     Peripheral vascular disease     Primary hypertension 3/4/2022     Past Surgical History:   Procedure Laterality Date    ABDOMINAL AORTA STENT  1997    Infrarenal abdominal stent, Dr. Spain, 1997.    APPENDECTOMY       CARDIAC CATHETERIZATION      CARDIAC CATHETERIZATION Left 3/4/2022    Procedure: Left Heart Cath;  Surgeon: Dashawn Bustillo MD;  Location:  [facility];  Service: Cardiovascular;  Laterality: Left;    CARDIAC CATHETERIZATION N/A 3/4/2022    Procedure: Left Heart Cath;  Surgeon: Dashawn Bustillo MD;  Location:  [facility];  Service: Cardiovascular;  Laterality: N/A;    CATARACT EXTRACTION, BILATERAL  2019    CHOLECYSTECTOMY      CORONARY ANGIOPLASTY WITH STENT PLACEMENT Right 2000    Multiple interventions with greater than 60-mm stent to RCA, 2000 to 2006.     CORONARY ARTERY BYPASS GRAFT  01/2007 January 2007, CABG x3 with LIMA to LAD, SVG to PDA, and SVG to posterolateral of the RCA, Dr. Siddiqi.    ILIAC ARTERY STENT  04/2008 April 2008, 5.0 x 27 bifurcational Express stents to iliac, ostium per Dr. العلي.    ILIAC ARTERY STENT Left 09/2012 September 2012, mechanical thrombectomy with stent x2 to the right common iliac artery, stent to the left common iliac artery, bifurcation stent placement to the left external iliac and left common iliac arteries, Dr. Earl.    TOTAL KNEE ARTHROPLASTY Left 09/2015    Left total knee replacement surgery per Dr. Fierro, September 2015.     OB History    No obstetric history on file.       Allergies   Allergen Reactions    Amoxicillin Unknown - Low Severity    Bactrim [Sulfamethoxazole-Trimethoprim] Itching    Crestor [Rosuvastatin Calcium]     Keflex [Cephalexin] Itching    Levofloxacin Unknown - Low Severity    Lipitor [Atorvastatin] Other (See Comments)     high-dose with thrush.      Lisinopril     Lortab [Hydrocodone-Acetaminophen]     Percocet [Oxycodone-Acetaminophen]     Pravachol [Pravastatin Sodium]     Zocor [Simvastatin] Myalgia     Muscle ache    Diclofenac Sodium Rash    Diclofenac Sodium Rash    Diphenhydramine Rash    Estradiol Rash     Vaginal cream caused itching and rash    Prednisone Rash    Tizanidine Rash     Tizanidine Hcl Rash       MEDS:  Prior to Admission medications    Medication Sig Start Date End Date Taking? Authorizing Provider   albuterol sulfate  (90 Base) MCG/ACT inhaler inhale 1 - 2 puffs (90 - 180 mcg) by inhalation route every 4 hours as needed 1/3/24   Giancarlo Rojas MD   aspirin 81 MG EC tablet Take 1 tablet by mouth Daily. 9/1/22   Kiet Ledesma PA-C   atorvastatin (LIPITOR) 40 MG tablet Take 1 tablet by mouth Daily.    Giancarlo Rojas MD   candesartan (ATACAND) 8 MG tablet Take 1 tablet by mouth Daily.    Giancarlo Rojas MD   clopidogrel (PLAVIX) 75 MG tablet Take  by mouth daily. 9/21/15   Giancarlo Rojas MD   Coenzyme Q10 200 MG capsule Take 100 mg by mouth Daily.    Giancarlo Rojas MD   empagliflozin (Jardiance) 10 MG tablet tablet Take 1 tablet by mouth Daily. 5/23/25   Octavio Barber MD   ezetimibe (ZETIA) 10 MG tablet Take 1 tablet by mouth Daily. 9/1/22   Kiet Ledesma PA-C   ferrous sulfate 324 (65 Fe) MG tablet delayed-release EC tablet Take 1 tablet by mouth Daily With Breakfast.    Giancarlo Rojas MD   FLUoxetine (PROzac) 20 MG capsule Take 1 capsule by mouth Daily.    Giancarlo Rojas MD   Fluticasone-Umeclidin-Vilant (TRELEGY ELLIPTA) 200-62.5-25 MCG/ACT inhaler Inhale 1 puff Daily. 4/16/25   Giancarlo Rojas MD   folic acid (FOLVITE) 400 MCG tablet Take 1 tablet by mouth Daily.    Giancarlo Rojas MD   furosemide (LASIX) 40 MG tablet Take 1 tablet by mouth Daily As Needed.    Giancarlo Rojas MD   isosorbide dinitrate (ISORDIL) 30 MG tablet Take 1 tablet by mouth Daily.    Giancarlo Rojas MD   nebivolol (BYSTOLIC) 10 MG tablet Take 1 tablet by mouth Daily.    Giancarlo Rojas MD   nitroglycerin (NITROSTAT) 0.4 MG SL tablet Place 1 tablet under the tongue Every 5 (Five) Minutes As Needed for Chest Pain. 4/15/19   Octavio Barber MD   Omega-3 Fatty Acids (FISH OIL) 1000 MG capsule capsule Take  by mouth  "daily. 9/21/15   Provider, MD Giancarlo   pantoprazole (PROTONIX) 40 MG EC tablet Take  by mouth daily. 9/21/15   ProviderGiancarlo MD         FH:  Family History   Problem Relation Age of Onset    Heart disease Mother     Hypertension Mother     Heart attack Mother     Stroke Father        Objective   Vital Signs:  /70   Pulse 59   Temp 98.5 °F (36.9 °C) (Infrared)   Ht 154.9 cm (61\")   Wt 97.1 kg (214 lb 1.6 oz)   SpO2 98%   BMI 40.45 kg/m²     Patient's (Body mass index is 40.45 kg/m².) indicates that they are morbidly obese (BMI > 40 or > 35 with obesity - related health condition)         Physical Exam  Vitals reviewed.   Constitutional:       Appearance: Normal appearance.   HENT:      Head: Normocephalic and atraumatic.      Nose: Nose normal.      Mouth/Throat:      Mouth: Mucous membranes are moist.   Cardiovascular:      Rate and Rhythm: Normal rate and regular rhythm.      Heart sounds: No murmur heard.     No friction rub. No gallop.   Pulmonary:      Effort: Pulmonary effort is normal. No respiratory distress.      Breath sounds: Normal breath sounds. No wheezing or rhonchi.   Neurological:      Mental Status: She is alert and oriented to person, place, and time.   Psychiatric:         Behavior: Behavior normal.               Result Review :           PAP Report:  AHI: 7.6/h  Days of Usage: 30/30 (100%)  Number of Days Greater than 4 hours: 83.3%  Average time (days used): 5 hours 49 minutes  90th Percentile Pressure: 9.5 cmH2O  Average time large leak: 37 minutes 12 seconds  Settings: Auto CPAP-a flex-8/18 cm H2O, a flex-2, ramp time 30 minutes, ramp start pressure 8 cm H2O.       Assessment and Plan  Daina Flowers is a 74 y.o. female who returns for follow-up and compliance of PAP therapy.  The pap report has been reviewed.  Overall usage is at 100% with compliance at 83.3%.  The patient averages 5 hours and 49 minutes of therapy.  AHI is 7.6/H.  I have asked her to try to be " refitted for a different style mask as she does have a significant leak.    I will refill the patient's supplies, and they will return for follow-up and compliance in 1 year or sooner should they have further questions or concerns.    Patient inquires about the inspire device.  She would likely need a sleep study in order to establish current severity of sleep apnea as well as presence of central apneas.  I have given the patient information regarding this device.  Patient is going to contact me after she decides if she would like to proceed with sleep study.      Diagnoses and all orders for this visit:    1. Obstructive sleep apnea, adult (Primary)  -     PAP Therapy    2. Morbid (severe) obesity due to excess calories               The patient continues to use and benefit from PAP therapy.    1. The patient was counseled regarding multimodal approach with healthy nutrition, healthy sleep, regular physical activity, social activities, counseling, and medications. Encouraged to practice lateral sleep position. Avoid alcohol and sedatives close to bedtime.     2.  We will refill supplies x1 year.  Return to clinic 1 year or sooner if symptoms warrant.  Patient gave verbal consent today for video visit. I have reviewed the results of my evaluation and impression and discussed my recommendations in detail with the patient.         Follow Up  Return in about 1 year (around 6/25/2026) for Annual visit.  Patient was given instructions and counseling regarding her condition or for health maintenance advice. Please see specific information pulled into the AVS if appropriate.       SONAL Lewis, ACNP-BC  Pulmonology, Critical Care, and Sleep Medicine

## 2025-06-25 ENCOUNTER — OFFICE VISIT (OUTPATIENT)
Dept: SLEEP MEDICINE | Age: 75
End: 2025-06-25
Payer: MEDICARE

## 2025-06-25 VITALS
OXYGEN SATURATION: 98 % | TEMPERATURE: 98.5 F | HEIGHT: 61 IN | DIASTOLIC BLOOD PRESSURE: 70 MMHG | HEART RATE: 59 BPM | WEIGHT: 214.1 LBS | SYSTOLIC BLOOD PRESSURE: 130 MMHG | BODY MASS INDEX: 40.42 KG/M2

## 2025-06-25 DIAGNOSIS — E66.01 MORBID (SEVERE) OBESITY DUE TO EXCESS CALORIES: ICD-10-CM

## 2025-06-25 DIAGNOSIS — G47.33 OBSTRUCTIVE SLEEP APNEA, ADULT: Primary | ICD-10-CM

## 2025-06-25 RX ORDER — PREDNISONE 1 MG/1
1 TABLET ORAL DAILY
COMMUNITY

## (undated) DEVICE — GLIDESHEATH BASIC HYDROPHILIC COATED INTRODUCER SHEATH: Brand: GLIDESHEATH

## (undated) DEVICE — DEV COMP RAD PRELUDESYNC 24CM

## (undated) DEVICE — RADIFOCUS TORQUE DEVICE MULTI-TORQUE VISE: Brand: RADIFOCUS TORQUE DEVICE

## (undated) DEVICE — PK CATH CARD 10

## (undated) DEVICE — DEV INFL MONARCH 25W

## (undated) DEVICE — GUIDE CATHETER: Brand: MACH1™

## (undated) DEVICE — MODEL AT P65, P/N 701554-001KIT CONTENTS: HAND CONTROLLER, 3-WAY HIGH-PRESSURE STOPCOCK WITH ROTATING END AND PREMIUM HIGH-PRESSURE TUBING: Brand: ANGIOTOUCH® KIT

## (undated) DEVICE — RADIFOCUS GLIDEWIRE: Brand: GLIDEWIRE

## (undated) DEVICE — NC TREK CORONARY DILATATION CATHETER 3.25 MM X 15 MM / RAPID-EXCHANGE: Brand: NC TREK

## (undated) DEVICE — MODEL BT2000 P/N 700287-012KIT CONTENTS: MANIFOLD WITH SALINE AND CONTRAST PORTS, SALINE TUBING WITH SPIKE AND HAND SYRINGE, TRANSDUCER: Brand: BT2000 AUTOMATED MANIFOLD KIT

## (undated) DEVICE — NDL ANGIOGR ADV THN SMOTH SGLWALL 21G 1.5

## (undated) DEVICE — PINNACLE INTRODUCER SHEATH: Brand: PINNACLE

## (undated) DEVICE — CATH DIAG EXPO M/ PK 6FR FL4/FR4 PIG 3PK

## (undated) DEVICE — CATH DIAG EXPO .056 FL3.5 6F 100CM

## (undated) DEVICE — TREK CORONARY DILATATION CATHETER 3.0 MM X 15 MM / RAPID-EXCHANGE: Brand: TREK

## (undated) DEVICE — GW PERIPH GUIDERIGHT STD/EXCHNG/J/TIP SS 0.035IN 5X260CM

## (undated) DEVICE — Device: Brand: OMNIWIRE PRESSURE GUIDE WIRE

## (undated) DEVICE — CATH DIAG EXPO .056 IM 6F 100CM

## (undated) DEVICE — HI-TORQUE WHISPER MS GUIDE WIRE .014 STRAIGHT TIP 3.0 CM X 190 CM: Brand: HI-TORQUE WHISPER

## (undated) DEVICE — KT VLV HEMO MAP ACC PLS LG/BORE MTL/INTRO W/TORQ/DEV

## (undated) DEVICE — ANGIO-SEAL VIP VASCULAR CLOSURE DEVICE: Brand: ANGIO-SEAL